# Patient Record
Sex: FEMALE | Race: WHITE | NOT HISPANIC OR LATINO | Employment: FULL TIME | URBAN - METROPOLITAN AREA
[De-identification: names, ages, dates, MRNs, and addresses within clinical notes are randomized per-mention and may not be internally consistent; named-entity substitution may affect disease eponyms.]

---

## 2017-08-23 ENCOUNTER — TRANSFERRED RECORDS (OUTPATIENT)
Dept: MULTI SPECIALTY CLINIC | Facility: CLINIC | Age: 57
End: 2017-08-23

## 2017-08-23 LAB
HPV ABSTRACT: NORMAL
PAP-ABSTRACT: NORMAL

## 2019-01-01 ENCOUNTER — TRANSFERRED RECORDS (OUTPATIENT)
Dept: MULTI SPECIALTY CLINIC | Facility: CLINIC | Age: 59
End: 2019-01-01

## 2020-08-18 ENCOUNTER — OFFICE VISIT (OUTPATIENT)
Dept: URGENT CARE | Facility: URGENT CARE | Age: 60
End: 2020-08-18
Payer: COMMERCIAL

## 2020-08-18 VITALS
BODY MASS INDEX: 28.17 KG/M2 | HEIGHT: 64 IN | HEART RATE: 63 BPM | TEMPERATURE: 97.5 F | WEIGHT: 165 LBS | SYSTOLIC BLOOD PRESSURE: 131 MMHG | OXYGEN SATURATION: 97 % | DIASTOLIC BLOOD PRESSURE: 51 MMHG

## 2020-08-18 DIAGNOSIS — R07.0 THROAT PAIN: Primary | ICD-10-CM

## 2020-08-18 LAB
DEPRECATED S PYO AG THROAT QL EIA: NEGATIVE
SPECIMEN SOURCE: NORMAL
SPECIMEN SOURCE: NORMAL
STREP GROUP A PCR: NOT DETECTED

## 2020-08-18 PROCEDURE — 87651 STREP A DNA AMP PROBE: CPT | Performed by: PHYSICIAN ASSISTANT

## 2020-08-18 PROCEDURE — U0003 INFECTIOUS AGENT DETECTION BY NUCLEIC ACID (DNA OR RNA); SEVERE ACUTE RESPIRATORY SYNDROME CORONAVIRUS 2 (SARS-COV-2) (CORONAVIRUS DISEASE [COVID-19]), AMPLIFIED PROBE TECHNIQUE, MAKING USE OF HIGH THROUGHPUT TECHNOLOGIES AS DESCRIBED BY CMS-2020-01-R: HCPCS | Performed by: PHYSICIAN ASSISTANT

## 2020-08-18 PROCEDURE — 99203 OFFICE O/P NEW LOW 30 MIN: CPT

## 2020-08-18 ASSESSMENT — MIFFLIN-ST. JEOR: SCORE: 1285.5

## 2020-08-18 ASSESSMENT — ENCOUNTER SYMPTOMS
SORE THROAT: 1
FATIGUE: 1

## 2020-08-18 NOTE — PATIENT INSTRUCTIONS
Patient Education     Self-Care for Sore Throats    Sore throats happen for many reasons, such as colds, allergies, and infections caused by viruses or bacteria. In any case, your throat becomes red and sore. Your goal for self-care is to reduce your discomfort while giving your throat a chance to heal.  Moisten and soothe your throat  Tips include the following:    Try a sip of water first thing after waking up.    Keep your throat moist by drinking 6 or more glasses of clear liquids every day.    Run a cool-air humidifier in your room overnight.    Avoid cigarette smoke.     Suck on throat lozenges, cough drops, hard candy, ice chips, or frozen fruit-juice bars. Use the sugar-free versions if your diet or medical condition requires them.  Gargle to ease irritation  Gargling every hour or 2 can ease irritation. Try gargling with 1 of these solutions:    1/4 teaspoon of salt in 1/2 cup of warm water    An over-the-counter anesthetic gargle  Use medicine for more relief  Over-the-counter medicine can reduce sore throat symptoms. Ask your pharmacist if you have questions about which medicine to use:    Ease pain with anesthetic sprays. Aspirin or an aspirin substitute also helps. Remember, never give aspirin to anyone 18 or younger, or if you are already taking blood thinners.     For sore throats caused by allergies, try antihistamines to block the allergic reaction.    Remember: unless a sore throat is caused by a bacterial infection, antibiotics won t help you.  Prevent future sore throats  Prevention tips include the following:    Stop smoking or reduce contact with secondhand smoke. Smoke irritates the tender throat lining.    Limit contact with pets and with allergy-causing substances, such as pollen and mold.    When you re around someone with a sore throat or cold, wash your hands often to keep viruses or bacteria from spreading.    Don t strain your vocal cords.  Contact your healthcare provider if you  have:    A temperature over 101 F (38.3 C)    White spots on the throat    Great difficulty swallowing    Trouble breathing    A skin rash    Recent exposure to someone else with strep bacteria    Severe hoarseness and swollen glands in the neck or jaw  Date Last Reviewed: 8/1/2016 2000-2019 [employer]. [address] [place] [address]. All rights reserved. This information is not intended as a substitute for professional medical care. Always follow your healthcare professional's instructions.           Patient Education     Understanding COVID-19 (Coronavirus Disease 2019)  COVID-19 is an illness of the lungs. It causes fever, coughing and trouble breathing. The illness is caused by a new virus in the coronavirus family called SARS-CoV-2.  First seen in late 2019, this virus has spread to many cities and countries around the world. Scientists are working to understand it better.   For the latest information, visit the CDC website at www.cdc.gov/coronavirus/2019-ncov. Or call 544-LBN-SVSZ ([phone]).  How does COVID-19 spread?   The virus seems to spread and infect people fairly easily. It may spread by:    Breathing in droplets of fluid that someone coughs or sneezes into the air.    Touching your eyes, nose or mouth after touching an infected surface. (The virus can live on handles, objects, and other surfaces.)  What are the symptoms of COVID-19?  Some people have no symptoms or only mild symptoms. Symptoms can vary from person to person. As experts learn more about COVID-19, new symptoms are being reported.  Symptoms may appear 2 to 14 days after contact with the virus. They include:    Fever    Coughing    Sore throat    Runny nose    Trouble breathing or feeling short of breath    Headache and body aches    Chills or repeated shaking with chills    Fatigue (feeling very tired)    Loss of appetite    Nausea or vomiting (feeling sick to your stomach or throwing up)    Diarrhea  (loose, watery poops)    Abdominal (belly) pain    Loss of smell and taste  You can check your symptoms with the [facility] s Coronavirus Self-.  What are possible problems from COVID-19?  In many cases, this virus can cause infection (pneumonia) in both lungs. This can make a person very ill, and it can even cause death.  Your chances of severe illness are higher if:    You re an older adult    You have a serious health issue, such as heart or lung disease, diabetes or kidney disease    You have a health problem (or take a medicine) that suppresses the immune system  Rarely, some children develop a severe problem called MIS-C. This is similar to Kawaski disease, which causes blood vessels and body organs to be inflamed. We don t yet know if MIS-C happens only in children, or if adults are also at risk. We also don t know if it's related to COVID-19--many children with MIS-C test positive for the virus, but not all.  Experts continue to study MIS-C. The [facility] has asked hospitals to report any person under 21 who has all of the following:    A fever over 100.4 F (38.0 C) for more than 24 hours and either a positive COVID-19 test or exposure to the virus in the last 4 weeks    Inflammation in at least 2 organs such as the heart, lungs or kidneys, with lab tests that show inflammation    No other diagnoses besides COVID-19 explain the child s symptoms  How is COVID-19 diagnosed?  Your care team will ask about your symptoms, recent travel and contact with sick people.  Not everyone will be tested for the virus. If you need to be tested, we will use a cotton swab to take a sample of cells from your nose and throat.    Antibody test (blood test). This test may show if you ve had the virus in the past--it won t tell us if you have it right now. (It takes a few weeks for the antibodies to show up in your blood). If you ve had the virus, you may have immunity (protection from the virus) in the future. We don t know yet how  long you would be immune. Antibody tests aren t always accurate.    Sputum test (we may collect mucus that you have coughed up from your lungs)    Chest X-ray or CT scan  How is COVID-19 treated?  At this time, there is no medicine to prevent or treat COVID-19. Experts are testing different medicines, trying to find one that works.  So far, the most proven treatment is to support your body while it fights the virus.    Get extra rest to help your body fight the illness.    Drink extra fluids (6 to 8 glasses of liquids each day), unless a doctor has told you not to. Ask your care team which fluids are best for you. Avoid fluids that contain caffeine or alcohol.    Take over-the-counter (OTC) medicine to reduce pain and fever. Your care team will tell you which medicine to use.  If you are very sick, you may need to stay in the hospital.    We may give you fluids through a vein to keep you hydrated (IV fluids).    To make sure your body gets enough oxygen, we may give you an oxygen mask or a breathing machine (ventilator).    We may turn you onto your stomach (called  prone positioning ). This will increase the oxygen in your lungs.  Those who have recovered from COVID-19 may be asked to donate plasma. (This is called COVID-19 convalescent plasma donation.) The plasma may contain antibodies to help fight the virus in others. Scientists are testing whether this might be a treatment in the future. For more information, talk to your care team.  Are you at risk for COVID-19?  Some studies suggest that people without symptoms may spread COVID-19.  You re at risk for getting COVID-19 if:    You live in (or recently traveled to) an area with a COVID-19 outbreak.    You had close contact with someone who has or may have COVID-19. Close contact means being within 6 feet, living in the same house, or visiting.  Last modified date: 5/20/2020  This information has been modified by your health care provider with permission from  the publisher.    [id number] [facility], [address] [place] [address], [place] [address]. All rights reserved. This information is not intended as a substitute for professional medical care. Always follow your healthcare professional's instructions. This information has been modified by your health care provider with permission from the publisher.

## 2020-08-18 NOTE — PROGRESS NOTES
"SUBJECTIVE:   Korin Islas is a 60 year old female presenting with a chief complaint of   Chief Complaint   Patient presents with     Urgent Care     Pt in clinic to have eval for throat irritation.     Throat Problem       She is a new patient of [facility].  ST and fatigue x 7 days.  No treatment.      URI Adult    Onset of symptoms was 7 day(s) ago.  Course of illness is same.    Severity moderate  Current and Associated symptoms: fatigue  Treatment measures tried include None tried.  Predisposing factors include None.        Review of Systems   Constitutional: Positive for fatigue.   HENT: Positive for sore throat.    All other systems reviewed and are negative.      No past medical history on file.  No family history on file.  Current Outpatient Medications   Medication Sig Dispense Refill     UNKNOWN TO PATIENT        Social History     Tobacco Use     Smoking status: Never Smoker     Smokeless tobacco: Never Used   Substance Use Topics     Alcohol use: Not on file       OBJECTIVE  /51   Pulse 63   Temp 97.5  F (36.4  C) (Oral)   Ht 1.613 m (5' 3.5\")   Wt 74.8 kg (165 lb)   SpO2 97%   BMI 28.77 kg/m      Physical Exam  Vitals signs and nursing note reviewed.   Constitutional:       Appearance: Normal appearance. She is normal weight.   HENT:      Head: Normocephalic and atraumatic.      Right Ear: Tympanic membrane, ear canal and external ear normal.      Left Ear: Tympanic membrane, ear canal and external ear normal.      Mouth/Throat:      Mouth: Mucous membranes are moist.      Pharynx: Oropharynx is clear. Posterior oropharyngeal erythema present.   Eyes:      Extraocular Movements: Extraocular movements intact.      Conjunctiva/sclera: Conjunctivae normal.   Neck:      Musculoskeletal: Normal range of motion and neck supple.   Cardiovascular:      Rate and Rhythm: Normal rate and regular rhythm.      Pulses: Normal pulses.      Heart sounds: Normal heart sounds.   Pulmonary:      Effort: " Pulmonary effort is normal.      Breath sounds: Normal breath sounds.   Skin:     General: Skin is warm and dry.      Capillary Refill: Capillary refill takes less than 2 seconds.   Neurological:      General: No focal deficit present.      Mental Status: She is alert.   Psychiatric:         Mood and Affect: Mood normal.         Behavior: Behavior normal.         Labs:  No results found for this or any previous visit (from the past 24 hour(s)).    X-Ray was not done.    ASSESSMENT:      ICD-10-CM    1. Throat pain  R07.0 Streptococcus A Rapid Scr w Reflx to PCR     Group A Streptococcus PCR Throat Swab     Symptomatic COVID-19 Virus (Coronavirus) by PCR        Medical Decision Making:    Differential Diagnosis:  URI Adult/Peds:  Strep pharyngitis and Viral pharyngitis    Serious Comorbid Conditions:  Adult:  None    PLAN:    URI Adult:  Tylenol, Ibuprofen, Fluids and Rest.  COVID test pending.      Followup:    If not improving or if condition worsens, follow up with your Primary Care Provider, If not improving or if conditions worsens over the next 12-24 hours, go to the Emergency Department    Patient Instructions     Patient Education     Self-Care for Sore Throats    Sore throats happen for many reasons, such as colds, allergies, and infections caused by viruses or bacteria. In any case, your throat becomes red and sore. Your goal for self-care is to reduce your discomfort while giving your throat a chance to heal.  Moisten and soothe your throat  Tips include the following:    Try a sip of water first thing after waking up.    Keep your throat moist by drinking 6 or more glasses of clear liquids every day.    Run a cool-air humidifier in your room overnight.    Avoid cigarette smoke.     Suck on throat lozenges, cough drops, hard candy, ice chips, or frozen fruit-juice bars. Use the sugar-free versions if your diet or medical condition requires them.  Gargle to ease irritation  Gargling every hour or 2 can ease  irritation. Try gargling with 1 of these solutions:    1/4 teaspoon of salt in 1/2 cup of warm water    An over-the-counter anesthetic gargle  Use medicine for more relief  Over-the-counter medicine can reduce sore throat symptoms. Ask your pharmacist if you have questions about which medicine to use:    Ease pain with anesthetic sprays. Aspirin or an aspirin substitute also helps. Remember, never give aspirin to anyone 18 or younger, or if you are already taking blood thinners.     For sore throats caused by allergies, try antihistamines to block the allergic reaction.    Remember: unless a sore throat is caused by a bacterial infection, antibiotics won t help you.  Prevent future sore throats  Prevention tips include the following:    Stop smoking or reduce contact with secondhand smoke. Smoke irritates the tender throat lining.    Limit contact with pets and with allergy-causing substances, such as pollen and mold.    When you re around someone with a sore throat or cold, wash your hands often to keep viruses or bacteria from spreading.    Don t strain your vocal cords.  Contact your healthcare provider if you have:    A temperature over 101 F (38.3 C)    White spots on the throat    Great difficulty swallowing    Trouble breathing    A skin rash    Recent exposure to someone else with strep bacteria    Severe hoarseness and swollen glands in the neck or jaw  Date Last Reviewed: 8/1/2016 2000-2019 [employer]. [address] [place], [place] [id number]. All rights reserved. This information is not intended as a substitute for professional medical care. Always follow your healthcare professional's instructions.           Patient Education     Understanding COVID-19 (Coronavirus Disease 2019)  COVID-19 is an illness of the lungs. It causes fever, coughing and trouble breathing. The illness is caused by a new virus in the coronavirus family called SARS-CoV-2.  First seen in late 2019, this virus has  spread to many cities and countries around the world. Scientists are working to understand it better.   For the latest information, visit the CDC website at www.cdc.gov/coronavirus/2019-ncov. Or call 565-QKX-FOGW ([phone]).  How does COVID-19 spread?   The virus seems to spread and infect people fairly easily. It may spread by:    Breathing in droplets of fluid that someone coughs or sneezes into the air.    Touching your eyes, nose or mouth after touching an infected surface. (The virus can live on handles, objects, and other surfaces.)  What are the symptoms of COVID-19?  Some people have no symptoms or only mild symptoms. Symptoms can vary from person to person. As experts learn more about COVID-19, new symptoms are being reported.  Symptoms may appear 2 to 14 days after contact with the virus. They include:    Fever    Coughing    Sore throat    Runny nose    Trouble breathing or feeling short of breath    Headache and body aches    Chills or repeated shaking with chills    Fatigue (feeling very tired)    Loss of appetite    Nausea or vomiting (feeling sick to your stomach or throwing up)    Diarrhea (loose, watery poops)    Abdominal (belly) pain    Loss of smell and taste  You can check your symptoms with the CDC s Coronavirus Self-.  What are possible problems from COVID-19?  In many cases, this virus can cause infection (pneumonia) in both lungs. This can make a person very ill, and it can even cause death.  Your chances of severe illness are higher if:    You re an older adult    You have a serious health issue, such as heart or lung disease, diabetes or kidney disease    You have a health problem (or take a medicine) that suppresses the immune system  Rarely, some children develop a severe problem called MIS-C. This is similar to Kawaski disease, which causes blood vessels and body organs to be inflamed. We don t yet know if MIS-C happens only in children, or if adults are also at risk. We also  don t know if it's related to COVID-19--many children with MIS-C test positive for the virus, but not all.  Experts continue to study MIS-C. The [facility] has asked hospitals to report any person under 21 who has all of the following:    A fever over 100.4 F (38.0 C) for more than 24 hours and either a positive COVID-19 test or exposure to the virus in the last 4 weeks    Inflammation in at least 2 organs such as the heart, lungs or kidneys, with lab tests that show inflammation    No other diagnoses besides COVID-19 explain the child s symptoms  How is COVID-19 diagnosed?  Your care team will ask about your symptoms, recent travel and contact with sick people.  Not everyone will be tested for the virus. If you need to be tested, we will use a cotton swab to take a sample of cells from your nose and throat.    Antibody test (blood test). This test may show if you ve had the virus in the past--it won t tell us if you have it right now. (It takes a few weeks for the antibodies to show up in your blood). If you ve had the virus, you may have immunity (protection from the virus) in the future. We don t know yet how long you would be immune. Antibody tests aren t always accurate.    Sputum test (we may collect mucus that you have coughed up from your lungs)    Chest X-ray or CT scan  How is COVID-19 treated?  At this time, there is no medicine to prevent or treat COVID-19. Experts are testing different medicines, trying to find one that works.  So far, the most proven treatment is to support your body while it fights the virus.    Get extra rest to help your body fight the illness.    Drink extra fluids (6 to 8 glasses of liquids each day), unless a doctor has told you not to. Ask your care team which fluids are best for you. Avoid fluids that contain caffeine or alcohol.    Take over-the-counter (OTC) medicine to reduce pain and fever. Your care team will tell you which medicine to use.  If you are very sick, you may need to  stay in the hospital.    We may give you fluids through a vein to keep you hydrated (IV fluids).    To make sure your body gets enough oxygen, we may give you an oxygen mask or a breathing machine (ventilator).    We may turn you onto your stomach (called  prone positioning ). This will increase the oxygen in your lungs.  Those who have recovered from COVID-19 may be asked to donate plasma. (This is called COVID-19 convalescent plasma donation.) The plasma may contain antibodies to help fight the virus in others. Scientists are testing whether this might be a treatment in the future. For more information, talk to your care team.  Are you at risk for COVID-19?  Some studies suggest that people without symptoms may spread COVID-19.  You re at risk for getting COVID-19 if:    You live in (or recently traveled to) an area with a COVID-19 outbreak.    You had close contact with someone who has or may have COVID-19. Close contact means being within 6 feet, living in the same house, or visiting.  Last modified date: 5/20/2020  This information has been modified by your health care provider with permission from the publisher.    [id number] [facility], [address] [place] [address], [place] [address]. All rights reserved. This information is not intended as a substitute for professional medical care. Always follow your healthcare professional's instructions. This information has been modified by your health care provider with permission from the publisher.

## 2020-08-19 LAB
SARS-COV-2 RNA SPEC QL NAA+PROBE: NOT DETECTED
SPECIMEN SOURCE: NORMAL

## 2021-11-02 ENCOUNTER — LAB (OUTPATIENT)
Dept: LAB | Facility: CLINIC | Age: 61
End: 2021-11-02

## 2021-11-02 ENCOUNTER — OFFICE VISIT (OUTPATIENT)
Dept: FAMILY MEDICINE | Facility: CLINIC | Age: 61
End: 2021-11-02
Payer: COMMERCIAL

## 2021-11-02 VITALS
SYSTOLIC BLOOD PRESSURE: 122 MMHG | DIASTOLIC BLOOD PRESSURE: 82 MMHG | WEIGHT: 173 LBS | OXYGEN SATURATION: 96 % | BODY MASS INDEX: 30.16 KG/M2 | HEART RATE: 68 BPM | TEMPERATURE: 97.1 F

## 2021-11-02 DIAGNOSIS — F33.9 RECURRENT MAJOR DEPRESSIVE DISORDER, REMISSION STATUS UNSPECIFIED (H): ICD-10-CM

## 2021-11-02 DIAGNOSIS — Z12.31 ENCOUNTER FOR SCREENING MAMMOGRAM FOR BREAST CANCER: ICD-10-CM

## 2021-11-02 DIAGNOSIS — M54.2 CERVICALGIA: Primary | ICD-10-CM

## 2021-11-02 DIAGNOSIS — M05.79 RHEUMATOID ARTHRITIS INVOLVING MULTIPLE SITES WITH POSITIVE RHEUMATOID FACTOR (H): ICD-10-CM

## 2021-11-02 DIAGNOSIS — Z13.220 SCREENING CHOLESTEROL LEVEL: ICD-10-CM

## 2021-11-02 DIAGNOSIS — M50.30 DDD (DEGENERATIVE DISC DISEASE), CERVICAL: ICD-10-CM

## 2021-11-02 DIAGNOSIS — M54.2 CERVICALGIA: ICD-10-CM

## 2021-11-02 LAB
AMPHETAMINES UR QL: NOT DETECTED
BARBITURATES UR QL SCN: NOT DETECTED
BENZODIAZ UR QL SCN: NOT DETECTED
BUPRENORPHINE UR QL: NOT DETECTED
CANNABINOIDS UR QL: NOT DETECTED
COCAINE UR QL SCN: NOT DETECTED
D-METHAMPHET UR QL: NOT DETECTED
METHADONE UR QL SCN: NOT DETECTED
OPIATES UR QL SCN: NOT DETECTED
OXYCODONE UR QL SCN: NOT DETECTED
PCP UR QL SCN: NOT DETECTED
PROPOXYPH UR QL: NOT DETECTED
TRICYCLICS UR QL SCN: NOT DETECTED

## 2021-11-02 PROCEDURE — 36415 COLL VENOUS BLD VENIPUNCTURE: CPT

## 2021-11-02 PROCEDURE — 80306 DRUG TEST PRSMV INSTRMNT: CPT

## 2021-11-02 PROCEDURE — 99214 OFFICE O/P EST MOD 30 MIN: CPT | Performed by: NURSE PRACTITIONER

## 2021-11-02 PROCEDURE — 80061 LIPID PANEL: CPT

## 2021-11-02 RX ORDER — CITALOPRAM HYDROBROMIDE 10 MG/1
10 TABLET ORAL DAILY
Qty: 90 TABLET | Refills: 3 | Status: SHIPPED | OUTPATIENT
Start: 2021-11-02 | End: 2022-09-29

## 2021-11-02 RX ORDER — HYDROCODONE BITARTRATE AND ACETAMINOPHEN 5; 325 MG/1; MG/1
1 TABLET ORAL EVERY 6 HOURS PRN
Qty: 18 TABLET | Refills: 0 | Status: SHIPPED | OUTPATIENT
Start: 2021-11-02 | End: 2021-11-05

## 2021-11-02 NOTE — LETTER
Opioid / Opioid Plus Controlled Substance Agreement    This is an agreement between you and your provider about the safe and appropriate use of controlled substance/opioids prescribed by your care team. Controlled substances are medicines that can cause physical and mental dependence (abuse).    There are strict laws about having and using these medicines. We here at [facility] are committing to working with you in your efforts to get better. To support you in this work, we ll help you schedule regular office appointments for medicine refills. If we must cancel or change your appointment for any reason, we ll make sure you have enough medicine to last until your next appointment.     As a Provider, I will:    Listen carefully to your concerns and treat you with respect.     Recommend a treatment plan that I believe is in your best interest. This plan may involve therapies other than opioid pain medication.     Talk with you often about the possible benefits, and the risk of harm of any medicine that we prescribe for you.     Provide a plan on how to taper (discontinue or go off) using this medicine if the decision is made to stop its use.    As a Patient, I understand that opioid(s):     Are a controlled substance prescribed by my care team to help me function or work and manage my condition(s).     Are strong medicines and can cause serious side effects such as:    Drowsiness, which can seriously affect my driving ability    A lower breathing rate, enough to cause death    Harm to my thinking ability     Depression     Abuse of and addiction to this medicine    Need to be taken exactly as prescribed. Combining opioids with certain medicines or chemicals (such as illegal drugs, sedatives, sleeping pills, and benzodiazepines) can be dangerous or even fatal. If I stop opioids suddenly, I may have severe withdrawal symptoms.    Do not work for all types of pain nor for all patients. If they re not helpful, I may  be asked to stop them.        The risks, benefits and side effects of these medicine(s) were explained to me. I agree that:  1. I will take part in other treatments as advised by my care team. This may be psychiatry or counseling, physical therapy, behavioral therapy, group treatment or a referral to a specialist.     2. I will keep all my appointments. I understand that this is part of the monitoring of opioids. My care team may require an office visit for EVERY opioid/controlled substance refill. If I miss appointments or don t follow instructions, my care team may stop my medicine.    3. I will take my medicines as prescribed. I will not change the dose or schedule unless my care team tells me to. There will be no refills if I run out early.     4. I may be asked to come to the clinic and complete a urine drug test or complete a pill count at any time. If I don t give a urine sample or participate in a pill count, the care team may stop my medicine.    5. I will only receive prescriptions from this clinic for chronic pain. If I am treated by another provider for acute pain issues, I will tell them that I am taking opioid pain medication for chronic pain and that I have a treatment agreement with this provider. I will inform my [facility] care team within one business day if I am given a prescription for any pain medication by another healthcare provider. My [facility] care team can contact other providers and pharmacists about my use of any medicines.    6. It is up to me to make sure that I don t run out of my medicines on weekends or holidays. If my care team is willing to refill my opioid prescription without a visit, I must request refills only during office hours. Refills may take up to 3 business days to process. I will use one pharmacy to fill all my opioid and other controlled substance prescriptions. I will notify the clinic about any changes to my insurance or medication  availability.    7. I am responsible for my prescriptions. If the medicine/prescription is lost, stolen or destroyed, it will not be replaced. I also agree not to share controlled substance medicines with anyone.    8. I am aware I should not use any illegal or recreational drugs. I agree not to drink alcohol unless my care team says I can.       9. If I enroll in the [place] [facility] Cannabis program, I will tell my care team prior to my next refill.     10. I will tell my care team right away if I become pregnant, have a new medical problem treated outside of my regular clinic, or have a change in my medications.    11. I understand that this medicine can affect my thinking, judgment and reaction time. Alcohol and drugs affect the brain and body, which can affect the safety of my driving. Being under the influence of alcohol or drugs can affect my decision-making, behaviors, personal safety, and the safety of others. Driving while impaired (DWI) can occur if a person is driving, operating, or in physical control of a car, motorcycle, boat, snowmobile, ATV, motorbike, off-road vehicle, or any other motor vehicle ([place] Statute 169A.20). I understand the risk if I choose to drive or operate any vehicle or machinery.    I understand that if I do not follow any of the conditions above, my prescriptions or treatment may be stopped or changed.          Opioids  What You Need to Know    What are opioids?   Opioids are pain medicines that must be prescribed by a doctor. They are also known as narcotics.     Examples are:   1. morphine (MS Contin, Claire)  2. oxycodone (Oxycontin)  3. oxycodone and acetaminophen (Percocet)  4. hydrocodone and acetaminophen (Vicodin, Norco)   5. fentanyl patch (Duragesic)   6. hydromorphone (Dilaudid)   7. methadone  8. codeine (Tylenol #3)     What do opioids do well?   Opioids are best for severe short-term pain such as after a surgery or injury. They may work well for cancer pain. They may  help some people with long-lasting (chronic) pain.     What do opioids NOT do well?   Opioids never get rid of pain entirely, and they don t work well for most patients with chronic pain. Opioids don t reduce swelling, one of the causes of pain.                                    Other ways to manage chronic pain and improve function include:       Treat the health problem that may be causing pain    Anti-inflammation medicines, which reduce swelling and tenderness, such as ibuprofen (Advil, Motrin) or naproxen (Aleve)    Acetaminophen (Tylenol)    Antidepressants and anti-seizure medicines, especially for nerve pain    Topical treatments such as patches or creams    Injections or nerve blocks    Chiropractic or osteopathic treatment    Acupuncture, massage, deep breathing, meditation, visual imagery, aromatherapy    Use heat or ice at the pain site    Physical therapy     Exercise    Stop smoking    Take part in therapy       Risks and side effects     Talk to your doctor before you start or decide to keep taking opioids. Possible side effects include:      Lowering your breathing rate enough to cause death    Overdose, including death, especially if taking higher than prescribed doses    Worse depression symptoms; less pleasure in things you usually enjoy    Feeling tired or sluggish    Slower thoughts or cloudy thinking    Being more sensitive to pain over time; pain is harder to control    Trouble sleeping or restless sleep    Changes in hormone levels (for example, less testosterone)    Changes in sex drive or ability to have sex    Constipation    Unsafe driving    Itching and sweating    Dizziness    Nausea, throwing up and dry mouth    What else should I know about opioids?    Opioids may lead to dependence, tolerance, or addiction.      Dependence means that if you stop or reduce the medicine too quickly, you will have withdrawal symptoms. These include loose poop (diarrhea), jitters, flu-like symptoms,  nervousness and tremors. Dependence is not the same as addiction.                       Tolerance means needing higher doses over time to get the same effect. This may increase the chance of serious side effects.      Addiction is when people improperly use a substance that harms their body, their mind or their relations with others. Use of opiates can cause a relapse of addiction if you have a history of drug or alcohol abuse.      People who have used opioids for a long time may have a lower quality of life, worse depression, higher levels of pain and more visits to doctors.    You can overdose on opioids. Take these steps to lower your risk of overdose:    1. Recognize the signs:  Signs of overdose include decrease or loss of consciousness (blackout), slowed breathing, trouble waking up and blue lips. If someone is worried about overdose, they should call 911.    2. Talk to your doctor about Narcan (naloxone).   If you are at risk for overdose, you may be given a prescription for Narcan. This medicine very quickly reverses the effects of opioids.   If you overdose, a friend or family member can give you Narcan while waiting for the ambulance. They need to know the signs of overdose and how to give Narcan.     3. Don't use alcohol or street drugs.   Taking them with opioids can cause death.    4. Do not take any of these medicines unless your doctor says it s OK. Taking these with opioids can cause death:    Benzodiazepines, such as lorazepam (Ativan), alprazolam (Xanax) or diazepam (Valium)    Muscle relaxers, such as cyclobenzaprine (Flexeril)    Sleeping pills like zolpidem (Ambien)     Other opioids      How to keep you and other people safe while taking opioids:    1. Never share your opioids with others.  Opioid medicines are regulated by the Drug Enforcement Agency (MATILDE). Selling or sharing medications is a criminal act.    2. Be sure to store opioids in a secure place, locked up if possible. Young children  can easily swallow them and overdose.    3. When you are traveling with your medicines, keep them in the original bottles. If you use a pill box, be sure you also carry a copy of your medicine list from your clinic or pharmacy.    4. Safe disposal of opioids    Most pharmacies have places to get rid of medicine, called disposal kiosks. Medicine disposal options are also available in every [facility]. Search your county and  medication disposal  to find more options. You can find more details at:  [email or web address]     I agree that my provider, clinic care team, and pharmacy may work with any city, state or federal law enforcement agency that investigates the misuse, sale, or other diversion of my controlled medicine. I will allow my provider to discuss my care with, or share a copy of, this agreement with any other treating provider, pharmacy or emergency room where I receive care.    I have read this agreement and have asked questions about anything I did not understand.    _______________________________________________________  Patient Signature - Korin Islas _____________________                   Date     _______________________________________________________  Provider Signature - KAHLIL Tarcy CNP   _____________________                   Date     _______________________________________________________  Witness Signature (required if provider not present while patient signing)   _____________________                   Date

## 2021-11-02 NOTE — PROGRESS NOTES
SUBJECTIVE:  Korin Islas, a 61 year old female, here today for an appointment to establish care and to discuss the following issues:    Chronic Neck Pain: has had two prior procedures on neck and spine, more than 15 years ago. Neck pain has been manageable with infrequent use of Norco; maybe once per week if pain is bad. Has had an MRI several years ago, none recently. No major changes, She notices daily stiffness and pain Has done physical therapy in the past and she keeps up with daily exercises. Has occasional numbness and tingling in hands. Has also previously been diagnosed with RA; has pain in hands, wrists and ankles as well; has not previously seen rheumatology.   s/p injection 9/2006 for ongoing pain- medications tried: tramadol and gabapentin  MRI- 2/7/2012 Last injection- 3/2008-2/7/2012         Takes Celexa for depression; started when her mom passed away and son was diagnosed with rectal cancer; she has kept taking it the past few years because she gets irritable when she stops it, but overall feels that mood is good.Notices some sweating at night; started menopause around age 38.    Pap Smear: ASCUS of cervix with negative high risk HPV 2013 2013 ASCUS/HPV negative. Plan: Routine screening. Pap/HPV due 8/2022         HPI:    Where was your previous physician's [facility]?: [facility]   Physician's Name: Genesis Brown   Last physical: not sure but about 1 year   Pt permission to access these records? (Care Everywhere or NACNIE): Yes       Social History     Tobacco Use     Smoking status: Never Smoker     Smokeless tobacco: Never Used   Substance Use Topics     Alcohol use: Not Currently     Comment: 0-1 per week       ROS:  CV: NEGATIVE for chest pain, palpitations   GI: NEGATIVE for nausea, abdominal pain, heartburn, or change in bowel habits  : NEGATIVE for frequency, dysuria, or hematuria  C: NEGATIVE for fever, chills  E: NEGATIVE for vision changes   R: NEGATIVE for significant cough or  SOB  N: NEGATIVE for weakness, dizziness or paresthesias or headache    OBJECTIVE:    /82   Pulse 68   Temp 97.1  F (36.2  C) (Temporal)   Wt 78.5 kg (173 lb)   SpO2 96%   BMI 30.16 kg/m      EXAM:  GENERAL APPEARANCE: healthy, alert and no distress  EYES: EOMI,  PERRL  RESP: lungs clear to auscultation - no rales, rhonchi or wheezes  CV: regular rates and rhythm, normal S1 S2, no S3 or S4 and no murmur, click or rub -  ABDOMEN:  soft, nontender, no HSM or masses and bowel sounds normal  MS: extremities normal- no gross deformities noted, no evidence of inflammation in joints, FROM in all extremities.  SKIN: no suspicious lesions or rashes  NEURO: Normal strength and tone, sensory exam grossly normal, mentation intact and speech normal  PSYCH: mentation appears normal and affect normal/bright    ASSESSMENT/PLAN:      ICD-10-CM    1. Cervicalgia  M54.2 HYDROcodone-acetaminophen (NORCO) 5-325 MG tablet     Drug Abuse Screen Panel 13, Urine (Pain Care Package) - lab collect   2. Recurrent major depressive disorder, remission status unspecified (H)  F33.9 citalopram (CELEXA) 10 MG tablet   3. Rheumatoid arthritis involving multiple sites with positive rheumatoid factor (H)  M05.79 Drug Abuse Screen Panel 13, Urine (Pain Care Package) - lab collect     Rheumatology Referral   4. Encounter for screening mammogram for breast cancer  Z12.31 *MA Screening Digital Bilateral   5. Screening cholesterol level  Z13.220 Lipid panel reflex to direct LDL Fasting

## 2021-11-03 LAB
CHOLEST SERPL-MCNC: 280 MG/DL
FASTING STATUS PATIENT QL REPORTED: YES
HDLC SERPL-MCNC: 58 MG/DL
LDLC SERPL CALC-MCNC: 180 MG/DL
NONHDLC SERPL-MCNC: 222 MG/DL
TRIGL SERPL-MCNC: 211 MG/DL

## 2022-09-29 ENCOUNTER — TELEPHONE (OUTPATIENT)
Dept: NURSING | Facility: CLINIC | Age: 62
End: 2022-09-29

## 2022-09-29 DIAGNOSIS — F33.9 RECURRENT MAJOR DEPRESSIVE DISORDER, REMISSION STATUS UNSPECIFIED (H): ICD-10-CM

## 2022-09-29 RX ORDER — CITALOPRAM HYDROBROMIDE 10 MG/1
10 TABLET ORAL DAILY
Qty: 90 TABLET | Refills: 1 | Status: SHIPPED | OUTPATIENT
Start: 2022-09-29 | End: 2023-03-07

## 2022-09-29 NOTE — TELEPHONE ENCOUNTER
"Refill/Order request:     Pt reports that she canceled her order with [facility] for her Citalopram because patient was trying to \"go off of it\", but when she tried to do so she had worsening anxiety.     Pt is calling today to report that [facility] is asking for a new prescription for this medication because they have discontinued it in their system.     Pt also reports that she has an annual appt with her PCP in December, 2022 as that was the soonest they could get her in.     Writer is routing this message to patient's care team pool to process this request.     Sandee Jacques RN  [facility] Nurse Advisor 11:40 AM 9/29/2022        "

## 2022-12-29 ENCOUNTER — LAB (OUTPATIENT)
Dept: LAB | Facility: CLINIC | Age: 62
End: 2022-12-29
Payer: COMMERCIAL

## 2022-12-29 ENCOUNTER — OFFICE VISIT (OUTPATIENT)
Dept: FAMILY MEDICINE | Facility: CLINIC | Age: 62
End: 2022-12-29
Payer: COMMERCIAL

## 2022-12-29 VITALS
OXYGEN SATURATION: 97 % | TEMPERATURE: 98.9 F | HEART RATE: 64 BPM | DIASTOLIC BLOOD PRESSURE: 75 MMHG | SYSTOLIC BLOOD PRESSURE: 124 MMHG | BODY MASS INDEX: 30.48 KG/M2 | HEIGHT: 63 IN | WEIGHT: 172 LBS | RESPIRATION RATE: 19 BRPM

## 2022-12-29 DIAGNOSIS — E55.9 VITAMIN D DEFICIENCY: ICD-10-CM

## 2022-12-29 DIAGNOSIS — Z12.4 SCREENING FOR CERVICAL CANCER: ICD-10-CM

## 2022-12-29 DIAGNOSIS — Z12.11 SCREENING FOR COLON CANCER: ICD-10-CM

## 2022-12-29 DIAGNOSIS — Z00.00 ROUTINE HISTORY AND PHYSICAL EXAMINATION OF ADULT: Primary | ICD-10-CM

## 2022-12-29 DIAGNOSIS — R06.83 SNORING: ICD-10-CM

## 2022-12-29 DIAGNOSIS — M05.79 RHEUMATOID ARTHRITIS INVOLVING MULTIPLE SITES WITH POSITIVE RHEUMATOID FACTOR (H): ICD-10-CM

## 2022-12-29 DIAGNOSIS — Z13.220 SCREENING CHOLESTEROL LEVEL: ICD-10-CM

## 2022-12-29 DIAGNOSIS — Z12.31 ENCOUNTER FOR SCREENING MAMMOGRAM FOR BREAST CANCER: ICD-10-CM

## 2022-12-29 PROBLEM — F41.8 DEPRESSION WITH ANXIETY: Status: ACTIVE | Noted: 2020-01-29

## 2022-12-29 PROBLEM — M48.02 SPINAL STENOSIS OF CERVICAL REGION: Status: ACTIVE | Noted: 2018-10-02

## 2022-12-29 LAB
ALBUMIN SERPL BCG-MCNC: 4.3 G/DL (ref 3.5–5.2)
ALP SERPL-CCNC: 86 U/L (ref 35–104)
ALT SERPL W P-5'-P-CCNC: 28 U/L (ref 10–35)
ANION GAP SERPL CALCULATED.3IONS-SCNC: 10 MMOL/L (ref 7–15)
AST SERPL W P-5'-P-CCNC: 27 U/L (ref 10–35)
BILIRUB SERPL-MCNC: 0.2 MG/DL
BUN SERPL-MCNC: 19.8 MG/DL (ref 8–23)
CALCIUM SERPL-MCNC: 9.2 MG/DL (ref 8.8–10.2)
CHLORIDE SERPL-SCNC: 106 MMOL/L (ref 98–107)
CHOLEST SERPL-MCNC: 227 MG/DL
CREAT SERPL-MCNC: 0.78 MG/DL (ref 0.51–0.95)
DEPRECATED CALCIDIOL+CALCIFEROL SERPL-MC: 28 UG/L (ref 20–75)
DEPRECATED HCO3 PLAS-SCNC: 24 MMOL/L (ref 22–29)
GFR SERPL CREATININE-BSD FRML MDRD: 84 ML/MIN/1.73M2
GLUCOSE SERPL-MCNC: 94 MG/DL (ref 70–99)
HDLC SERPL-MCNC: 56 MG/DL
LDLC SERPL CALC-MCNC: 139 MG/DL
NONHDLC SERPL-MCNC: 171 MG/DL
POTASSIUM SERPL-SCNC: 4.9 MMOL/L (ref 3.4–5.3)
PROT SERPL-MCNC: 7.1 G/DL (ref 6.4–8.3)
SODIUM SERPL-SCNC: 140 MMOL/L (ref 136–145)
TRIGL SERPL-MCNC: 160 MG/DL

## 2022-12-29 PROCEDURE — 82306 VITAMIN D 25 HYDROXY: CPT

## 2022-12-29 PROCEDURE — 99396 PREV VISIT EST AGE 40-64: CPT | Mod: 25 | Performed by: NURSE PRACTITIONER

## 2022-12-29 PROCEDURE — 99213 OFFICE O/P EST LOW 20 MIN: CPT | Mod: 25 | Performed by: NURSE PRACTITIONER

## 2022-12-29 PROCEDURE — 90471 IMMUNIZATION ADMIN: CPT | Performed by: NURSE PRACTITIONER

## 2022-12-29 PROCEDURE — 90682 RIV4 VACC RECOMBINANT DNA IM: CPT | Performed by: NURSE PRACTITIONER

## 2022-12-29 PROCEDURE — G0145 SCR C/V CYTO,THINLAYER,RESCR: HCPCS | Performed by: NURSE PRACTITIONER

## 2022-12-29 PROCEDURE — 80053 COMPREHEN METABOLIC PANEL: CPT

## 2022-12-29 PROCEDURE — 36415 COLL VENOUS BLD VENIPUNCTURE: CPT

## 2022-12-29 PROCEDURE — 87624 HPV HI-RISK TYP POOLED RSLT: CPT | Performed by: NURSE PRACTITIONER

## 2022-12-29 PROCEDURE — 80061 LIPID PANEL: CPT

## 2022-12-29 ASSESSMENT — ENCOUNTER SYMPTOMS
COUGH: 0
NAUSEA: 0
PALPITATIONS: 0
SORE THROAT: 0
WEAKNESS: 0
HEMATOCHEZIA: 0
ARTHRALGIAS: 1
PARESTHESIAS: 0
HEADACHES: 0
CONSTIPATION: 0
FREQUENCY: 0
HEARTBURN: 0
ABDOMINAL PAIN: 0
DIARRHEA: 0
JOINT SWELLING: 0
DIZZINESS: 0
CHILLS: 0
HEMATURIA: 0
EYE PAIN: 0
SHORTNESS OF BREATH: 0
BREAST MASS: 0
MYALGIAS: 0
NERVOUS/ANXIOUS: 0
DYSURIA: 0
FEVER: 0

## 2022-12-29 ASSESSMENT — PATIENT HEALTH QUESTIONNAIRE - PHQ9
SUM OF ALL RESPONSES TO PHQ QUESTIONS 1-9: 6
SUM OF ALL RESPONSES TO PHQ QUESTIONS 1-9: 6
10. IF YOU CHECKED OFF ANY PROBLEMS, HOW DIFFICULT HAVE THESE PROBLEMS MADE IT FOR YOU TO DO YOUR WORK, TAKE CARE OF THINGS AT HOME, OR GET ALONG WITH OTHER PEOPLE: NOT DIFFICULT AT ALL

## 2022-12-29 ASSESSMENT — PAIN SCALES - GENERAL: PAINLEVEL: NO PAIN (0)

## 2022-12-29 NOTE — PROGRESS NOTES
SUBJECTIVE:   CC: Korin is an 62 year old who presents for preventive health visit.   Patient has been advised of split billing requirements and indicates understanding: Yes  Healthy Habits:     Getting at least 3 servings of Calcium per day:  Yes    Bi-annual eye exam:  Yes    Dental care twice a year:  Yes    Sleep apnea or symptoms of sleep apnea:  Daytime drowsiness and Excessive snoring    Diet:  Regular (no restrictions)    Frequency of exercise:  None    Taking medications regularly:  Yes    Medication side effects:  Not applicable    PHQ-2 Total Score: 0    Additional concerns today:  No    Snoring: Has been a heavy snorer for years. Often does not feel rested in the morning when she wakes up; falls asleep easily during the day if she is in the car. Neither parents/siblings had sleep apnea. Has never had a sleep study.     Rheumatoid arthritis- joints have been more sore lately. Elbows and neck. Weather changes seem to make it worse.    Mood stable; feels like medication working well to keep mood stable. No side effects that she has noticed.    Today's PHQ-2 Score:   PHQ-2 ( 1999 Pf[employer]) 12/29/2022   Q1: Little interest or pleasure in doing things 0   Q2: Feeling down, depressed or hopeless 0   PHQ-2 Score 0   PHQ-2 Total Score (12-17 Years)- Positive if 3 or more points; Administer PHQ-A if positive -   Q1: Little interest or pleasure in doing things Not at all   Q2: Feeling down, depressed or hopeless Not at all   PHQ-2 Score 0   Answers for HPI/ROS submitted by the patient on 12/29/2022  If you checked off any problems, how difficult have these problems made it for you to do your work, take care of things at home, or get along with other people?: Not difficult at all  PHQ9 TOTAL SCORE: 6      Have you ever done Advance Care Planning? (For example, a Health Directive, POLST, or a discussion with a medical provider or your loved ones about your wishes): No, advance care planning information given to  patient to review.  Patient plans to discuss their wishes with loved ones or provider.      Social History     Tobacco Use     Smoking status: Never     Smokeless tobacco: Never   Substance Use Topics     Alcohol use: Not Currently     Comment: 0-1 per week     If you drink alcohol do you typically have >3 drinks per day or >7 drinks per week? No    Alcohol Use 12/29/2022   Prescreen: >3 drinks/day or >7 drinks/week? No   No flowsheet data found.    Reviewed orders with patient.  Reviewed health maintenance and updated orders accordingly - Yes  Lab work is in process  Labs reviewed in EPIC    Breast Cancer Screening:    FHS-7:   Breast CA Risk Assessment (FHS-7) 12/29/2022   Did any of your first-degree relatives have breast or ovarian cancer? Yes   Did any of your relatives have bilateral breast cancer? No   Did any man in your family have breast cancer? No   Did any woman in your family have breast and ovarian cancer? Yes   Did any woman in your family have breast cancer before age 50 y? Yes   Do you have 2 or more relatives with breast and/or ovarian cancer? No   Do you have 2 or more relatives with breast and/or bowel cancer? No     click delete button to remove this line now  Mammogram Screening: Recommended mammography every 1-2 years with patient discussion and risk factor consideration  Pertinent mammograms are reviewed under the imaging tab.    History of abnormal Pap smear: NO - age 30-65 PAP every 5 years with negative HPV co-testing recommended     Reviewed and updated as needed this visit by clinical staff                  Reviewed and updated as needed this visit by Provider                     Review of Systems   Constitutional: Negative for chills and fever.   HENT: Negative for congestion, ear pain, hearing loss and sore throat.    Eyes: Negative for pain and visual disturbance.   Respiratory: Negative for cough and shortness of breath.    Cardiovascular: Negative for chest pain, palpitations and  peripheral edema.   Gastrointestinal: Negative for abdominal pain, constipation, diarrhea, heartburn, hematochezia and nausea.   Breasts:  Negative for tenderness, breast mass and discharge.   Genitourinary: Negative for dysuria, frequency, genital sores, hematuria, pelvic pain, urgency, vaginal bleeding and vaginal discharge.   Musculoskeletal: Positive for arthralgias. Negative for joint swelling and myalgias.   Skin: Negative for rash.   Neurological: Negative for dizziness, weakness, headaches and paresthesias.   Psychiatric/Behavioral: Negative for mood changes. The patient is not nervous/anxious.      CONSTITUTIONAL: NEGATIVE for fever, chills, change in weight  INTEGUMENTARU/SKIN: NEGATIVE for worrisome rashes, moles or lesions  EYES: NEGATIVE for vision changes or irritation  ENT: NEGATIVE for ear, mouth and throat problems  RESP: NEGATIVE for significant cough or SOB  BREAST: NEGATIVE for masses, tenderness or discharge  CV: NEGATIVE for chest pain, palpitations or peripheral edema  GI: NEGATIVE for nausea, abdominal pain, heartburn, or change in bowel habits  : NEGATIVE for unusual urinary or vaginal symptoms. Periods are regular.  MUSCULOSKELETAL: NEGATIVE for significant arthralgias or myalgia  NEURO: NEGATIVE for weakness, dizziness or paresthesias  PSYCHIATRIC: NEGATIVE for changes in mood or affect     OBJECTIVE:   There were no vitals taken for this visit.  Physical Exam  GENERAL: healthy, alert and no distress  EYES: Eyes grossly normal to inspection, PERRL and conjunctivae and sclerae normal  NECK: no adenopathy, no asymmetry, masses, or scars and thyroid normal to palpation  RESP: lungs clear to auscultation - no rales, rhonchi or wheezes  BREAST: normal without masses, tenderness or nipple discharge and no palpable axillary masses or adenopathy  CV: regular rate and rhythm, normal S1 S2, no S3 or S4, no murmur, click or rub, no peripheral edema and peripheral pulses strong  ABDOMEN: soft,  nontender, no hepatosplenomegaly, no masses and bowel sounds normal   (female): normal female external genitalia, normal urethral meatus, vaginal mucosa, normal cervix/adnexa/uterus without masses or discharge  MS: no gross musculoskeletal defects noted, no edema  SKIN: no suspicious lesions or rashes  PSYCH: mentation appears normal, affect normal/bright    Diagnostic Test Results:  Labs reviewed in Epic  No results found for this or any previous visit (from the past 24 hour(s)).    ASSESSMENT/PLAN:   (Z00.00) Routine history and physical examination of adult  (primary encounter diagnosis)  Comment:   Plan:     (Z12.31) Encounter for screening mammogram for breast cancer  Comment:   Plan: *MA Screening Digital Bilateral            (Z12.4) Screening for cervical cancer  Comment:   Plan: Pap screen with HPV - recommended age 30 - 65         years            (Z12.11) Screening for colon cancer  Comment:   Plan:     (R06.83) Snoring  Comment:   Plan: Adult Sleep Eval & Management          Referral            (M05.79) Rheumatoid arthritis involving multiple sites with positive rheumatoid factor (H)  Comment:   Plan: diclofenac (VOLTAREN) 1 % topical gel,         Comprehensive metabolic panel (BMP + Alb, Alk         Phos, ALT, AST, Total. Bili, TP)            (Z13.220) Screening cholesterol level  Comment:   Plan: Lipid panel reflex to direct LDL Fasting            (E55.9) Vitamin D deficiency  Comment:   Plan: **Vitamin D Deficiency FUTURE 2mo              Patient has been advised of split billing requirements and indicates understanding: Yes      COUNSELING:  Reviewed preventive health counseling, as reflected in patient instructions       Regular exercise       Healthy diet/nutrition        She reports that she has never smoked. She has never used smokeless tobacco.      KAHLIL Tracy CNP  [facility]

## 2022-12-30 ENCOUNTER — TELEPHONE (OUTPATIENT)
Dept: FAMILY MEDICINE | Facility: CLINIC | Age: 62
End: 2022-12-30

## 2022-12-30 DIAGNOSIS — M05.79 RHEUMATOID ARTHRITIS INVOLVING MULTIPLE SITES WITH POSITIVE RHEUMATOID FACTOR (H): ICD-10-CM

## 2022-12-30 NOTE — TELEPHONE ENCOUNTER
Clarification Request:   Voltaren Gel 1% is not covered     Covered alternatives Meloxicam tab 7.5mg, Nabumetone tab 500mg, etodolac cap 200mg

## 2022-12-30 NOTE — TELEPHONE ENCOUNTER
RX Clarification  RX is not covered. Requesting approval to dispense alternative.    1. MELOXICAM TAB 7.5 MG  2.NABUMETONE  MG   3 ETODOLAC 200 MG      Order # [id number]    If not, they are asking to submit PA    diclofenac (VOLTAREN) 1 % topical gel

## 2023-01-02 LAB
BKR LAB AP GYN ADEQUACY: NORMAL
BKR LAB AP GYN INTERPRETATION: NORMAL
BKR LAB AP HPV REFLEX: NORMAL
BKR LAB AP PREVIOUS ABNORMAL: NORMAL
PATH REPORT.COMMENTS IMP SPEC: NORMAL
PATH REPORT.COMMENTS IMP SPEC: NORMAL
PATH REPORT.RELEVANT HX SPEC: NORMAL

## 2023-01-03 LAB
HUMAN PAPILLOMA VIRUS 16 DNA: NEGATIVE
HUMAN PAPILLOMA VIRUS 18 DNA: NEGATIVE
HUMAN PAPILLOMA VIRUS FINAL DIAGNOSIS: NORMAL
HUMAN PAPILLOMA VIRUS OTHER HR: NEGATIVE

## 2023-01-04 ENCOUNTER — ANCILLARY PROCEDURE (OUTPATIENT)
Dept: MAMMOGRAPHY | Facility: CLINIC | Age: 63
End: 2023-01-04
Attending: NURSE PRACTITIONER
Payer: COMMERCIAL

## 2023-01-04 DIAGNOSIS — Z12.31 ENCOUNTER FOR SCREENING MAMMOGRAM FOR BREAST CANCER: ICD-10-CM

## 2023-01-04 PROCEDURE — 77067 SCR MAMMO BI INCL CAD: CPT | Mod: TC | Performed by: RADIOLOGY

## 2023-01-04 PROCEDURE — 77063 BREAST TOMOSYNTHESIS BI: CPT | Mod: TC | Performed by: RADIOLOGY

## 2023-03-05 DIAGNOSIS — F33.9 RECURRENT MAJOR DEPRESSIVE DISORDER, REMISSION STATUS UNSPECIFIED (H): ICD-10-CM

## 2023-03-07 RX ORDER — CITALOPRAM HYDROBROMIDE 10 MG/1
TABLET ORAL
Qty: 90 TABLET | Refills: 3 | Status: SHIPPED | OUTPATIENT
Start: 2023-03-07 | End: 2024-01-03

## 2023-05-12 ENCOUNTER — VIRTUAL VISIT (OUTPATIENT)
Dept: SLEEP MEDICINE | Facility: CLINIC | Age: 63
End: 2023-05-12
Attending: NURSE PRACTITIONER
Payer: COMMERCIAL

## 2023-05-12 VITALS — BODY MASS INDEX: 29.02 KG/M2 | WEIGHT: 170 LBS | HEIGHT: 64 IN

## 2023-05-12 DIAGNOSIS — E66.3 OVERWEIGHT (BMI 25.0-29.9): ICD-10-CM

## 2023-05-12 DIAGNOSIS — G47.9 SLEEP DISTURBANCE: Primary | ICD-10-CM

## 2023-05-12 DIAGNOSIS — G47.00 INSOMNIA, UNSPECIFIED TYPE: ICD-10-CM

## 2023-05-12 DIAGNOSIS — R40.0 DAYTIME SOMNOLENCE: ICD-10-CM

## 2023-05-12 DIAGNOSIS — R06.83 SNORING: ICD-10-CM

## 2023-05-12 PROBLEM — R89.9 ABNORMAL LABORATORY TEST: Status: ACTIVE | Noted: 2017-09-14

## 2023-05-12 PROBLEM — M25.512 CHRONIC LEFT SHOULDER PAIN: Status: ACTIVE | Noted: 2018-09-23

## 2023-05-12 PROBLEM — G89.29 CHRONIC LEFT SHOULDER PAIN: Status: ACTIVE | Noted: 2018-09-23

## 2023-05-12 PROBLEM — M50.30 DEGENERATION OF CERVICAL INTERVERTEBRAL DISC: Status: ACTIVE | Noted: 2023-05-12

## 2023-05-12 PROCEDURE — 99204 OFFICE O/P NEW MOD 45 MIN: CPT | Mod: VID | Performed by: NURSE PRACTITIONER

## 2023-05-12 ASSESSMENT — SLEEP AND FATIGUE QUESTIONNAIRES
HOW LIKELY ARE YOU TO NOD OFF OR FALL ASLEEP WHILE SITTING QUIETLY AFTER LUNCH WITHOUT ALCOHOL: WOULD NEVER DOZE
HOW LIKELY ARE YOU TO NOD OFF OR FALL ASLEEP WHILE WATCHING TV: HIGH CHANCE OF DOZING
HOW LIKELY ARE YOU TO NOD OFF OR FALL ASLEEP WHILE LYING DOWN TO REST IN THE AFTERNOON WHEN CIRCUMSTANCES PERMIT: MODERATE CHANCE OF DOZING
HOW LIKELY ARE YOU TO NOD OFF OR FALL ASLEEP WHILE SITTING AND TALKING TO SOMEONE: WOULD NEVER DOZE
HOW LIKELY ARE YOU TO NOD OFF OR FALL ASLEEP WHILE SITTING AND READING: MODERATE CHANCE OF DOZING
HOW LIKELY ARE YOU TO NOD OFF OR FALL ASLEEP IN A CAR, WHILE STOPPED FOR A FEW MINUTES IN TRAFFIC: WOULD NEVER DOZE
HOW LIKELY ARE YOU TO NOD OFF OR FALL ASLEEP WHILE SITTING INACTIVE IN A PUBLIC PLACE: WOULD NEVER DOZE
HOW LIKELY ARE YOU TO NOD OFF OR FALL ASLEEP WHEN YOU ARE A PASSENGER IN A CAR FOR AN HOUR WITHOUT A BREAK: MODERATE CHANCE OF DOZING

## 2023-05-12 ASSESSMENT — PAIN SCALES - GENERAL: PAINLEVEL: NO PAIN (0)

## 2023-05-12 NOTE — PATIENT INSTRUCTIONS
"Medicare and Medicaid patients starting on CPAP or biPAP on or after 5/12/2023  Medicare patients should schedule an IN PERSON CLINIC appointment to provide your CPAP/biPAP usage data to a provider within 90 days of starting CPAP  Virtual visits are no longer allowed for this visit for Medicare                MY TREATMENT INFORMATION FOR SLEEP APNEA-  Korin Islas    DOCTOR : KAHLIL Edward CNP    Am I having a sleep study at a sleep center?  --->Due to normal delays, you will be contacted within 2-4 weeks to schedule    Am I having a home sleep study?  --->Watch the video for the device you are using:    -/drop off device-   https://www.50 [employer]es.com/watch?v=yGGFBdELGhk    -Disposable device sent out require phone/computer application-   https://www.[employer].com/watch?v=BCce_vbiwxE      Frequently asked questions:  1. What is Obstructive Sleep Apnea (GUSTAVO)? GUSTAVO is the most common type of sleep apnea. Apnea means, \"without breath.\"  Apnea is most often caused by narrowing or collapse of the upper airway as muscles relax during sleep.   Almost everyone has occasional apneas. Most people with sleep apnea have had brief interruptions at night frequently for many years.  The severity of sleep apnea is related to how frequent and severe the events are.   2. What are the consequences of GUSTAVO? Symptoms include: feeling sleepy during the day, snoring loudly, gasping or stopping of breathing, trouble sleeping, and occasionally morning headaches or heartburn at night.  Sleepiness can be serious and even increase the risk of falling asleep while driving. Other health consequences may include development of high blood pressure and other cardiovascular disease in persons who are susceptible. Untreated GUSTAVO  can contribute to heart disease, stroke and diabetes.   3. What are the treatment options? In most situations, sleep apnea is a lifelong disease that must be managed with daily therapy. Medications are not " effective for sleep apnea and surgery is generally not considered until other therapies have been tried. Your treatment is your choice . Continuous Positive Airway (CPAP) works right away and is the therapy that is effective in nearly everyone. An oral device to hold your jaw forward is usually the next most reliable option. Other options include postioning devices (to keep you off your back), weight loss, and surgery including a tongue pacing device. There is more detail about some of these options below.  4. Are my sleep studies covered by insurance? Although we will request verification of coverage, we advise you also check in advance of the study to ensure there is coverage.    Important tips for those choosing CPAP and similar devices  For new devices, sign up for device MARS to monitor your device for your followup visits  We encourage you to utilize the [place] mars or website (myAir web (resmed.com) ) to monitor your therapy progress and share the data with your healthcare team when you discuss your sleep apnea.                                                    Know your equipment:  CPAP is continuous positive airway pressure that prevents obstructive sleep apnea by keeping the throat from collapsing while you are sleeping. In most cases, the device is  smart  and can slowly self-adjusts if your throat collapses and keeps a record every day of how well you are treated-this information is available to you and your care team.  BPAP is bilevel positive airway pressure that keeps your throat open and also assists each breath with a pressure boost to maintain adequate breathing.  Special kinds of BPAP are used in patients who have inadequate breathing from lung or heart disease. In most cases, the device is  smart  and can slowly self-adjusts to assist breathing. Like CPAP, the device keeps a record of how well you are treated.  Your mask is your connection to the device. You get to choose what feels most  comfortable and the staff will help to make sure if fits. Here: are some examples of the different masks that are available:       Key points to remember on your journey with sleep apnea:  Sleep study.  PAP devices often need to be adjusted during a sleep study to show that they are effective and adjusted right.  Good tips to remember: Try wearing just the mask during a quiet time during the day so your body adapts to wearing it. A humidifier is recommended for comfort in most cases to prevent drying of your nose and throat. Allergy medication from your provider may help you if you are having nasal congestion.  Getting settled-in. It takes more than one night for most of us to get used to wearing a mask. Try wearing just the mask during a quiet time during the day so your body adapts to wearing it. A humidifier is recommended for comfort in most cases. Our team will work with you carefully on the first day and will be in contact within 4 days and again at 2 and 4 weeks for advice and remote device adjustments. Your therapy is evaluated by the device each day.   Use it every night. The more you are able to sleep naturally for 7-8 hours, the more likely you will have good sleep and to prevent health risks or symptoms from sleep apnea. Even if you use it 4 hours it helps. Occasionally all of us are unable to use a medical therapy, in sleep apnea, it is not dangerous to miss one night.   Communicate. Call our skilled team on the number provided on the first day if your visit for problems that make it difficult to wear the device. Over 2 out of 3 patients can learn to wear the device long-term with help from our team. Remember to call our team or your sleep providers if you are unable to wear the device as we may have other solutions for those who cannot adapt to mask CPAP therapy. It is recommended that you sleep your sleep provider within the first 3 months and yearly after that if you are not having problems.   Use it  for your health. We encourage use of CPAP masks during daytime quiet periods to allow your face and brain to adapt to the sensation of CPAP so that it will be a more natural sensation to awaken to at night or during naps. This can be very useful during the first few weeks or months of adapting to CPAP though it does not help medically to wear CPAP during wakefulness and  should not be used as a strategy just to meet guidelines.  Take care of your equipment. Make sure you clean your mask and tubing using directions every day and that your filter and mask are replaced as recommended or if they are not working.     BESIDES CPAP, WHAT OTHER THERAPIES ARE THERE?    Positioning Device  Positioning devices are generally used when sleep apnea is mild and only occurs on your back.This example shows a pillow that straps around the waist. It may be appropriate for those whose sleep study shows milder sleep apnea that occurs primarily when lying flat on one's back. Preliminary studies have shown benefit but effectiveness at home may need to be verified by a home sleep test. These devices are generally not covered by medical insurance.  Examples of devices that maintain sleeping on the back to prevent snoring and mild sleep apnea.    Belt type body positioner  http://[employer]/    Electronic reminder  http://nightshifttherapy.com/            Oral Appliance  What is oral appliance therapy?  An oral appliance device fits on your teeth at night like a retainer used after having braces. The device is made by a specialized dentist and requires several visits over 1-2 months before a manufactured device is made to fit your teeth and is adjusted to prevent your sleep apnea. Once an oral device is working properly, snoring should be improved. A home sleep test may be recommended at that time if to determine whether the sleep apnea is adequately treated.       Some things to remember:  -Oral devices are often, but not always, covered  by your medical insurance. Be sure to check with your insurance provider.   -If you are referred for oral therapy, you will be given a list of specialized dentists to consider or you may choose to visit the Web site of the American Academy of Dental Sleep Medicine  -Oral devices are less likely to work if you have severe sleep apnea or are extremely overweight.     More detailed information  An oral appliance is a small acrylic device that fits over the upper and lower teeth  (similar to a retainer or a mouth guard). This device slightly moves jaw forward, which moves the base of the tongue forward, opens the airway, improves breathing for effective treat snoring and obstructive sleep apnea in perhaps 7 out of 10 people .  The best working devices are custom-made by a dental device  after a mold is made of the teeth 1, 2, 3.  When is an oral appliance indicated?  Oral appliance therapy is recommended as a first-line treatment for patients with primary snoring, mild sleep apnea, and for patients with moderate sleep apnea who prefer appliance therapy to use of CPAP4, 5. Severity of sleep apnea is determined by sleep testing and is based on the number of respiratory events per hour of sleep.   How successful is oral appliance therapy?  The success rate of oral appliance therapy in patients with mild sleep apnea is 75-80% while in patients with moderate sleep apnea it is 50-70%. The chance of success in patients with severe sleep apnea is 40-50%. The research also shows that oral appliances have a beneficial effect on the cardiovascular health of GUSTAVO patients at the same magnitude as CPAP therapy7.  Oral appliances should be a second-line treatment in cases of severe sleep apnea, but if not completely successful then a combination therapy utilizing CPAP plus oral appliance therapy may be effective. Oral appliances tend to be effective in a broad range of patients although studies show that the patients who  have the highest success are females, younger patients, those with milder disease, and less severe obesity. 3, 6.   Finding a dentist that practices dental sleep medicine  Specific training is available through the American Academy of Dental Sleep Medicine for dentists interested in working in the field of sleep. To find a dentist who is educated in the field of sleep and the use of oral appliances, near you, visit the Web site of the American Academy of Dental Sleep Medicine.    References  1. Ami, et al. Objectively measured vs self-reported compliance during oral appliance therapy for sleep-disordered breathing. Chest 2013; 144(5): 2242-3621.  2. Sinan, et al. Objective measurement of compliance during oral appliance therapy for sleep-disordered breathing. Thorax 2013; 68(1): 91-96.  3. Raul et al. Mandibular advancement devices in 620 men and women with GUSTAVO and snoring: tolerability and predictors of treatment success. Chest 2004; 125: 0537-3270.  4. Brittany, et al. Oral appliances for snoring and GUSTAVO: a review. Sleep 2006; 29: 244-262.  5. Guillermina et al. Oral appliance treatment for GUSTAVO: an update. J Clin Sleep Med 2014; 10(2): 215-227.  6. Jigna et al. Predictors of OSAH treatment outcome. J Dent Res 2007; 86: 8973-8578.      Weight Loss:    Weight loss is a long-term strategy that may improve sleep apnea in some patients.    Weight management is a personal decision and the decision should be based on your interest and the potential benefits.  If you are interested in exploring weight loss strategies, the following discussion covers the impact on weight loss on sleep apnea and the approaches that may be successful.    Being overweight does not necessarily mean you will have health consequences.  Those who have BMI over 35 or over 27 with existing medical conditions carries greater risk.   Weight loss decreases severity of sleep apnea in most people with obesity. For those with mild  obesity who have developed snoring with weight gain, even 15-30 pound weight loss can improve and occasionally eliminate sleep apnea.  Structured and life-long dietary and health habits are necessary to lose weight and keep healthier weight levels.     Though there may be significant health benefits from weight loss, long-term weight loss is very difficult to achieve- studies show success with dietary management in less than 10% of people. In addition, substantial weight loss may require years of dietary control and may be difficult if patients have severe obesity. In these cases, surgical management may be considered.  Finally, older individuals who have tolerated obesity without health complications may be less likely to benefit from weight loss strategies.      Your BMI is Body mass index is 29.64 kg/m .    What is BMI?  Body mass index (BMI) is one way to tell whether you are at a healthy weight, overweight, or obese. It measures your weight in relation to your height.  A BMI of 18.5 to 24.9 is in the healthy range. A person with a BMI of 25 to 29.9 is considered overweight, and someone with a BMI of 30 or greater is considered obese.  Another way to find out if you are at risk for health problems caused by overweight and obesity is to measure your waist. If you are a woman and your waist is more than 35 inches, or if you are a man and your waist is more than 40 inches, your risk of disease may be higher.  More than two-thirds of American adults are considered overweight or obese. Being overweight or obese increases the risk for further weight gain.  Excess weight may lead to heart disease and diabetes. Creating and following plans for healthy eating and physical activity may help you improve your health.    Methods for maintaining or losing weight.  Weight control is part of healthy lifestyle and includes exercise, emotional health, and healthy eating habits.  Careful eating habits lifelong is the mainstay of  weight control.  Though there are significant health benefits from weight loss, long-term weight loss with diet alone may be very difficult to achieve- studies show long-term success with dietary management in less than 10% of people. Attaining a healthy weight may be especially difficult to achieve in those with severe obesity. In some cases, medications, devices and surgical management might be considered.    What can you do?  If you are overweight or obese and are interested in methods for weight loss, you should discuss this with your provider. In addition, we recommend that you review healthy life styles and methods for weight loss available through the National Institutes of Health patient information sites:   http://win.niddk.nih.gov/publications/index.htm    Surgery:    Surgery for obstructive sleep apnea is considered generally only when other therapies fail to work. Surgery may be discussed with you if you are having a difficult time tolerating CPAP and or when there is an abnormal structure that requires surgical correction.  Nose and throat surgeries often enlarge the airway to prevent collapse.  Most of these surgeries create pain for 1-2 weeks and up to half of the most common surgeries are not effective throughout life.  You should carefully discuss the benefits and drawbacks to surgery with your sleep provider and surgeon to determine if it is the best solution for you.   More information  Surgery for GUSTAVO is directed at areas that are responsible for narrowing or complete obstruction of the airway during sleep.  There are a wide range of procedures available to enlarge and/or stabilize the airway to prevent blockage of breathing in the three major areas where it can occur: the palate, tongue, and nasal regions.  Successful surgical treatment depends on the accurate identification of the factors responsible for obstructive sleep apnea in each person.  A personalized approach is required because there  is no single treatment that works well for everyone.  Because of anatomic variation, consultation with an examination by a sleep surgeon is a critical first step in determining what surgical options are best for each patient.  In some cases, examination during sedation may be recommended in order to guide the selection of procedures.  Patients will be counseled about risks and benefits as well as the typical recovery course after surgery. Surgery is typically not a cure for a person s GUSTAVO.  However, surgery will often significantly improve one s GUSTAVO severity (termed  success rate ).  Even in the absence of a cure, surgery will decrease the cardiovascular risk associated with OSA7; improve overall quality of life8 (sleepiness, functionality, sleep quality, etc).      Palate Procedures:  Patients with GUSTAVO often have narrowing of their airway in the region of their tonsils and uvula.  The goals of palate procedures are to widen the airway in this region as well as to help the tissues resist collapse.  Modern palate procedure techniques focus on tissue conservation and soft tissue rearrangement, rather than tissue removal.  Often the uvula is preserved in this procedure. Residual sleep apnea is common in patient after pharyngoplasty with an average reduction in sleep apnea events of 33%2.      Tongue Procedures:  ExamWhile patients are awake, the muscles that surround the throat are active and keep this region open for breathing. These muscles relax during sleep, allowing the tongue and other structures to collapse and block breathing.  There are several different tongue procedures available.  Selection of a tongue base procedure depends on characteristics seen on physical exam.  Generally, procedures are aimed at removing bulky tissues in this area or preventing the back of the tongue from falling back during sleep.  Success rates for tongue surgery range from 50-62%3.    Hypoglossal Nerve Stimulation:  Hypoglossal  nerve stimulation has recently received approval from the [place] Food and Drug Administration for the treatment of obstructive sleep apnea.  This is based on research showing that the system was safe and effective in treating sleep apnea6.  Results showed that the median AHI score decreased 68%, from 29.3 to 9.0. This therapy uses an implant system that senses breathing patterns and delivers mild stimulation to airway muscles, which keeps the airway open during sleep.  The system consists of three fully implanted components: a small generator (similar in size to a pacemaker), a breathing sensor, and a stimulation lead.  Using a small handheld remote, a patient turns the therapy on before bed and off upon awakening.    Candidates for this device must be greater than 18 years of age, have moderate to severe GUSTAVO (AHI between 15-65), BMI less than 35, have tried CPAP/oral appliance for at least 8 weeks without success, and have appropriate upper airway anatomy (determined by a sleep endoscopy performed by Dr. Ashu Paz).    Hypoglossal Nerve Stimulation Pathway:    The sleep surgeon s office will work with the patient through the insurance prior-authorization process (including communications and appeals).    Nasal Procedures:  Nasal obstruction can interfere with nasal breathing during the day and night.  Studies have shown that relief of nasal obstruction can improve the ability of some patients to tolerate positive airway pressure therapy for obstructive sleep apnea1.  Treatment options include medications such as nasal saline, topical corticosteroid and antihistamine sprays, and oral medications such as antihistamines or decongestants. Non-surgical treatments can include external nasal dilators for selected patients. If these are not successful by themselves, surgery can improve the nasal airway either alone or in combination with these other options.      Combination Procedures:  Combination of surgical  procedures and other treatments may be recommended, particularly if patients have more than one area of narrowing or persistent positional disease.  The success rate of combination surgery ranges from 66-80%2,3.    References  Lizandro MARIE. The Role of the Nose in Snoring and Obstructive Sleep Apnoea: An Update.  Eur Arch Otorhinolaryngol. 2011; 268: 1365-73.   Avtar SM; Nishant JA; Tess JR; Pallanch JF; Medina MB; Edwina SG; Onofre BOWERS. Surgical modifications of the upper airway for obstructive sleep apnea in adults: a systematic review and meta-analysis. SLEEP 2010;33(10):9076-6504. Hermilo HEARN. Hypopharyngeal surgery in obstructive sleep apnea: an evidence-based medicine review.  Arch Otolaryngol Head Neck Surg. 2006 Feb;132(2):206-13.  Sinan YH1, Johanna Y, Basil FELIBERTO. The efficacy of anatomically based multilevel surgery for obstructive sleep apnea. Otolaryngol Head Neck Surg. 2003 Oct;129(4):327-35.  Kezirian E, Goldberg A. Hypopharyngeal Surgery in Obstructive Sleep Apnea: An Evidence-Based Medicine Review. Arch Otolaryngol Head Neck Surg. 2006 Feb;132(2):206-13.  Antonietta PJ et al. Upper-Airway Stimulation for Obstructive Sleep Apnea.  N Engl J Med. 2014 Jan 9;370(2):139-49.  Caroline Y et al. Increased Incidence of Cardiovascular Disease in Middle-aged Men with Obstructive Sleep Apnea. Am J Respir Crit Care Med; 2002 166: 159-165  Portillo EM et al. Studying Life Effects and Effectiveness of Palatopharyngoplasty (SLEEP) study: Subjective Outcomes of Isolated Uvulopalatopharyngoplasty. Otolaryngol Head Neck Surg. 2011; 144: 623-631.        WHAT IF I ONLY HAVE SNORING?    Mandibular advancement devices, lateral sleep positioning, long-term weight loss and treatment of nasal allergies have been shown to improve snoring.  Exercising tongue muscles with a game (https://apps.FANCRU/us/mars/soundly-reduce-snoring/kr2491787283) or stimulating the tongue during the day with a device (https://doi.org/10.1130/sjk39934396) have  improved snoring in some individuals.    Remember to Drive Safe... Drive Alive     Sleep health profoundly affects your health, mood, and your safety.  Thirty three percent of the population (one in three of us) is not getting enough sleep and many have a sleep disorder. Not getting enough sleep or having an untreated / undertreated sleep condition may make us sleepy without even knowing it. In fact, our driving could be dramatically impaired due to our sleep health. As your provider, here are some things I would like you to know about driving:     Here are some warning signs for impairment and dangerous drowsy driving:              -Having been awake more than 16 hours               -Looking tired               -Eyelid drooping              -Head nodding (it could be too late at this point)              -Driving for more than 30 minutes     Some things you could do to make the driving safer if you are experiencing some drowsiness:              -Stop driving and rest              -Call for transportation              -Make sure your sleep disorder is adequately treated     Some things that have been shown NOT to work when experiencing drowsiness while driving:              -Turning on the radio              -Opening windows              -Eating any  distracting  /  entertaining  foods (e.g., sunflower seeds, candy, or any other)              -Talking on the phone      Your decision may not only impact your life, but also the life of others. Please, remember to drive safe for yourself and all of us.

## 2023-05-12 NOTE — NURSING NOTE
Is the patient currently in the state of [place]? YES    Visit mode:VIDEO    If the visit is dropped, the patient can be reconnected by: VIDEO VISIT: Send to e-mail at: [email or web address]    Will anyone else be joining the visit? NO      How would you like to obtain your AVS? MyChart    Are changes needed to the allergy or medication list? NO    Reason for visit: Video Visit (Snoring )      Has patient had flu shot for current/most recent flu season? If so, when? Yes: 12/2022    Marisa SANCHEZ

## 2023-05-12 NOTE — PROGRESS NOTES
Virtual Visit Details    Type of service:  Video Visit     Originating Location (pt. Location): Home    Distant Location (provider location):  Off-site  Platform used for Video Visit: [facility]       Outpatient Sleep Medicine Consultation:      Name: Korin Islas MRN# [id number]   Age: 62 year old YOB: 1960     Date of Consultation: May 12, 2023  Consultation is requested by: KAHLIL Tracy CNP  [address]  [place] [address] Kavitha Singleton  Primary care provider: Kavitha Singleton       Reason for Sleep Consult:     Korin Islas is sent by Kavitha Singleton for a sleep consultation regarding snoring, possible GUSTAVO.    Patient s Reason for visit  Korin Islas main reason for visit: Trouble sraying asleep. Snore.  cant sleep  Patient states problem(s) started: A couple years ago. Getting worse  Korin Islas's goals for this visit: Get resolution           Assessment and Plan:     Summary Sleep Diagnoses:  1. Sleep disturbance  2. Snoring  3. Daytime somnolence  4. Insomnia, unspecified type  5. Overweight (BMI 25.0-29.9)  - Adult Sleep Eval & Management  Referral  - Comprehensive Sleep Study; Future      Comorbid Diagnoses:  1.  Rheumatoid arthritis  2.  Depression with anxiety  3.  Cervical DJD      Summary Recommendations:  1.  Recommend further evaluation with in lab polysomnography (PSG) for possible sleep disordered breathing.  Her STOP-BANG score is 3 which suggest a high risk of GUSTAVO using the enhanced version of the questionnaire.  Her symptoms are consistent with probable obstructive sleep apnea.  2.  Her insomnia severity index score is 15 which suggests moderate severity clinical insomnia.  This is likely associated with chronic multiple joint pains related to rheumatoid arthritis and cervical spinal stenosis/DJD.  She does follow with a rheumatologist for this.  3.  All potential therapeutic options including positive airway  pressure, mandibular advancing oral appliances, and surgical options were discussed. Also counseled about impact of weight loss on GUSTAVO.   4.  Follow-up in approximately 2 weeks after the sleep study to review the results and to determine next steps.    Orders Placed This Encounter   Procedures     Comprehensive Sleep Study         Summary Counseling:    Sleep Testing Reviewed  Obstructive Sleep Apnea Reviewed  Complications of Untreated Sleep Apnea Reviewed  Previous recent chart notes, lab results    Medical Decision-making:   Educational materials provided in instructions    Total time spent reviewing medical records, history and physical examination, review of previous testing and interpretation as well as documentation on this date: 50 minutes    CC: Kavitha Singleton          History of Present Illness:     Korin Islas is a 62-year-old female with a PMH pertinent for rheumatoid arthritis, HLD, depression with anxiety, cervical DJD, and overweight who presents today with symptoms of very loud snoring, wakes due to snoring/snort, morning headaches, difficulty staying asleep/sleep maintenance, daytime somnolence, recurring nightmares, limbs go numb with lateral sleep, and nonrestorative sleep for several years.  She was referred by her primary care provider for further evaluation of possible sleep disordered breathing.    Past Sleep Evaluations: No    SLEEP-WAKE SCHEDULE:     Work/School Days: Patient goes to school/work: Yes, works day hours - office work for [employer]   Usually gets into bed at By 9 or 10pm  Takes patient about Not long to fall asleep  Has trouble falling asleep I dont nights per week  Wakes up in the middle of the night Every 1-2 hours times.  Wakes up due to Snorting self awake;External stimuli (bed partner, pets, noise, etc);Use the bathroom  She has trouble falling back asleep Never times a week.   It usually takes Right away to 1/2 hour to get back to sleep  Patient is  usually up at 6am  Uses alarm: Yes    Weekends/Non-work Days/All Other Days:  Usually gets into bed at 9-10 pm   Takes patient about Right away, 5 minutes to fall asleep  Patient is usually up at 6am  Uses alarm: Yes    Sleep Need  Patient gets  6 hours sleep on average   Patient thinks she needs about 8 sleep    Korin Islas prefers to sleep in this position(s): Side   Patient states they do the following activities in bed: Watch TV    Naps  Patient takes a purposeful nap Never on purpose times a week and naps are usually 1/2 hour in duration  She feels better after a nap: Yes  She dozes off unintentionally Every night days per week  Patient has had a driving accident or near-miss due to sleepiness/drowsiness: No      SLEEP DISRUPTIONS:    Breathing/Snoring  Patient snores:Yes  Other people complain about her snoring: Yes  Patient has been told she stops breathing in her sleep:No  She has issues with the following: Morning headaches;Morning mouth dryness;Heartburn or reflux at night;Getting up to urinate more than once    Movement:  Patient gets pain, discomfort, with an urge to move:  Yes  It happens when she is resting:  Yes  It happens more at night:  Yes  Patient has been told she kicks her legs at night:  No     Behaviors in Sleep:  Korin Islas has experienced the following behaviors while sleeping: Recurring Nightmares  She has experienced sudden muscle weakness during the day: No      Is there anything else you would like your sleep provider to know: Toss and turn every hour or so. Body goes numb      CAFFEINE AND OTHER SUBSTANCES:    Patient consumes caffeinated beverages per day:  2-3 cups of coffee  Last caffeine use is usually: 11am  List of any prescribed or over the counter stimulants that patient takes: None  List of any prescribed or over the counter sleep medication patient takes:    List of previous sleep medications that patient has tried:    Patient drinks alcohol to help them sleep:  No  Patient drinks alcohol near bedtime: No     Alcohol use: Infrequent use, socially 1-2 drinks  Nicotine/tobacco use:  None - quit in 2000  Recreational drug use: None      Family History:  Patient has a family member been diagnosed with a sleep disorder: No            SCALES:    EPWORTH SLEEPINESS SCALE         5/12/2023     3:39 PM    Kenova Sleepiness Scale ( JAMAAL Lundberg  1990-1997[facility] [place]/English - Final version - 21 Nov 07 - [facility] [place].)   Sitting and reading Moderate chance of dozing   Watching TV High chance of dozing   Sitting, inactive in a public place (e.g. a theatre or a meeting) Would never doze   As a passenger in a car for an hour without a break Moderate chance of dozing   Lying down to rest in the afternoon when circumstances permit Moderate chance of dozing   Sitting and talking to someone Would never doze   Sitting quietly after a lunch without alcohol Would never doze   In a car, while stopped for a few minutes in traffic Would never doze   Kenova Score (MC) 9   Kenova Score (Sleep) 9         INSOMNIA SEVERITY INDEX (IRINA)          5/12/2023     3:22 PM   Insomnia Severity Index (IRINA)   Difficulty falling asleep 0   Difficulty staying asleep 3   Problems waking up too early 0   How SATISFIED/DISSATISFIED are you with your CURRENT sleep pattern? 3   How NOTICEABLE to others do you think your sleep problem is in terms of impairing the quality of your life? 4   How WORRIED/DISTRESSED are you about your current sleep problem? 3   To what extent do you consider your sleep problem to INTERFERE with your daily functioning (e.g. daytime fatigue, mood, ability to function at work/daily chores, concentration, memory, mood, etc.) CURRENTLY? 2   IRINA Total Score 15       Guidelines for Scoring/Interpretation:  Total score categories:  0-7 = No clinically significant insomnia   8-14 = Subthreshold insomnia   15-21 = Clinical insomnia (moderate severity)  22-28 = Clinical insomnia  "(severe)  Used via courtesy of www.myhealth.va.gov with permission from Carroll Seo PhD., [facility]      STOP BANG score: 3        5/12/2023     3:41 PM   STOP BANG Questionnaire (  2008, the American Society of Anesthesiologists, Inc. [employer]   1. Snoring - Do you snore loudly (louder than talking or loud enough to be heard through closed doors)? Yes   2. Tired - Do you often feel tired, fatigued, or sleepy during daytime? Yes   3. Observed - Has anyone observed you stop breathing during your sleep? No   4. Blood pressure - Do you have or are you being treated for high blood pressure? No   5. BMI - BMI more than 35 kg/m2? No   6. Age - Age over 50 yr old? Yes   7. Neck circumference - Neck circumference greater than 40 cm? No   8. Gender - Gender male? No   STOP BANG Score (MC): 4 (High risk of GUSTAVO)         GAD7         View : No data to display.                  CAGE-AID         View : No data to display.                CAGE-AID reprinted with permission from the [place] Medical Journal, JOSE Jordan. and CHERRY Chun, \"Conjoint screening questionnaires for alcohol and drug abuse\" [place] Medical Journal 94: 135-140, 1995.      PATIENT HEALTH QUESTIONNAIRE-9 (PHQ - 9)        12/29/2022     8:12 AM   PHQ-9 ([employer])   1.  Little interest or pleasure in doing things 0   2.  Feeling down, depressed, or hopeless 0   3.  Trouble falling or staying asleep, or sleeping too much 3   4.  Feeling tired or having little energy 3   5.  Poor appetite or overeating 0   6.  Feeling bad about yourself - or that you are a failure or have let yourself or your family down 0   7.  Trouble concentrating on things, such as reading the newspaper or watching television 0   8.  Moving or speaking so slowly that other people could have noticed. Or the opposite - being so fidgety or restless that you have been moving around a lot more than usual 0   9.  Thoughts that you would be better off dead, or " of hurting yourself in some way 0   PHQ-9 Total Score 6   6.  Feeling bad about yourself 0   7.  Trouble concentrating 0   8.  Moving slowly or restless 0   9.  Suicidal or self-harm thoughts 0   1.  Little interest or pleasure in doing things Not at all   2.  Feeling down, depressed, or hopeless Not at all   3.  Trouble falling or staying asleep, or sleeping too much Nearly every day   4.  Feeling tired or having little energy Nearly every day   5.  Poor appetite or overeating Not at all   6.  Feeling bad about yourself Not at all   7.  Trouble concentrating Not at all   8.  Moving slowly or restless Not at all   9.  Suicidal or self-harm thoughts Not at all   PHQ-9 via [employer] TOTAL SCORE-----> 6 (Mild depression)   Difficulty at work, home, or with people Not difficult at all       Developed by Elmira Mane, Ashley Diop, Brian Casillas and colleagues, with an educational milton from [employer]. No permission required to reproduce, translate, display or distribute.        Allergies:    No Known Allergies    Medications:    Current Outpatient Medications   Medication Sig Dispense Refill     diclofenac (VOLTAREN) 1 % topical gel Apply 4 g topically 4 times daily 100 g 3     citalopram (CELEXA) 10 MG tablet TAKE 1 TABLET BY MOUTH  DAILY (Patient not taking: Reported on 5/12/2023) 90 tablet 3       Problem List:  Patient Active Problem List    Diagnosis Date Noted     Degeneration of cervical intervertebral disc 05/12/2023     Priority: Medium     Formatting of this note might be different from the original.  s/p injection 9/2006 for ongoing pain- medications tried: tramadol and gabapentin  MRI- 2/7/2012   Last injection- 3/2008-2/7/2012   Physical therapy last done years ago.    Current medications: Vicodin (ran out)       Depression with anxiety 01/29/2020     Priority: Medium     Spinal stenosis of cervical region 10/02/2018     Priority: Medium     Formatting of this note might be different from the  original.  TCO diagnosis C3-C4 and C4-C5 bilateral foraminal stenosis with mild to moderate central stenosis and no signs of myelomalacia       Chronic left shoulder pain 2018     Priority: Medium     Formatting of this note might be different from the original.  Xray: calcific tendinopathy of the rotator cuff, ac joint arthritis mild  Referral to Orthopedics (2018)  If not helping consider vascular medicine for TOS evaluation.       Abnormal laboratory test 2017     Priority: Medium     Formatting of this note might be different from the original.  Symptoms: joint pain  Lab: rheumatoid arthritis factor + 47.3  Joints: neck, hands, fingers, wrists everything ache all the time.   Recommendations: Rheumatology.       Hyperlipidemia with target low density lipoprotein (LDL) cholesterol less than 130 mg/dL 2008     Priority: Medium     Formatting of this note might be different from the original.  10/25/2013  Chol: 231    T    HDL:   49  LDL:  142     Recommendations:       Carpal tunnel syndrome of left wrist 2008     Priority: Medium     Formatting of this note might be different from the original.  EMG 2008 left mild median neuropathy EMG 2018 normal.   Therapies: none, no bracing.   Current medications: none.          Past Medical/Surgical History:  Past Medical History:   Diagnosis Date     Arthritis     neck, wrists     Past Surgical History:   Procedure Laterality Date     BACK SURGERY         Social History:  Social History     Socioeconomic History     Marital status:      Spouse name: Not on file     Number of children: Not on file     Years of education: Not on file     Highest education level: Not on file   Occupational History     Not on file   Tobacco Use     Smoking status: Never     Smokeless tobacco: Never   Vaping Use     Vaping status: Never Used   Substance and Sexual Activity     Alcohol use: Not Currently     Comment: 0-1 per week     Drug use: Never  "    Sexual activity: Yes     Partners: Male   Other Topics Concern     Not on file   Social History Narrative     Not on file     Social Determinants of Health     Financial Resource Strain: Not on file   Food Insecurity: Not on file   Transportation Needs: Not on file   Physical Activity: Not on file   Stress: Not on file   Social Connections: Not on file   Intimate Partner Violence: Not on file   Housing Stability: Not on file       Family History:  Family History   Problem Relation Age of Onset     Cancer Mother         breast cancer-60, lung     Cancer Father         esophageal     Breast Cancer Sister         double masectomy     No Known Problems Maternal Grandmother      Cancer Maternal Grandfather         bone     Parkinsonism Paternal Grandmother      No Known Problems Paternal Grandfather        Review of Systems:  A complete review of systems reviewed by me is negative with the exeption of what has been mentioned in the history of present illness.  In the last TWO WEEKS have you experienced any of the following symptoms?  Fevers: No  Night Sweats: Yes  Weight Gain: No  Pain at Night: Yes  Double Vision: No  Changes in Vision: No  Difficulty Breathing through Nose: No  Sore Throat in Morning: No  Dry Mouth in the Morning: Yes  Shortness of Breath Lying Flat: No  Shortness of Breath With Activity: Yes  Awakening with Shortness of Breath: No  Increased Cough: No  Heart Racing at Night: No  Swelling in Feet or Legs: No  Diarrhea at Night: No  Heartburn at Night: Yes  Urinating More than Once at Night: Yes  Losing Control of Urine at Night: No  Joint Pains at Night: Yes  Headaches in Morning: No  Weakness in Arms or Legs: Yes  Depressed Mood: No  Anxiety: No     Physical Examination:  Vitals: Ht 1.613 m (5' 3.5\")   Wt 77.1 kg (170 lb)   BMI 29.64 kg/m    BMI= Body mass index is 29.64 kg/m .           GENERAL APPEARANCE: healthy, alert, no distress and cooperative  EYES: Eyes grossly normal to " inspection  RESP: Unlabored, easy breathing with normal conversation speech  CV: color normal  NEURO: Alert and oriented x3, normal strength and tone, mentation intact and speech normal  PSYCH: mentation appears normal and affect normal/bright  Mallampati Class: Unable to examine  Tonsillar Stage: Unable to examine         Data: All pertinent previous laboratory data reviewed     Recent Labs   Lab Test 12/29/22  0943      POTASSIUM 4.9   CHLORIDE 106   CO2 24   ANIONGAP 10   GLC 94   BUN 19.8   CR 0.78   LOGAN 9.2       No results for input(s): WBC, RBC, HGB, HCT, MCV, MCH, MCHC, RDW, PLT in the last 68004 hours.    Recent Labs   Lab Test 12/29/22  0943   PROTTOTAL 7.1   ALBUMIN 4.3   BILITOTAL 0.2   ALKPHOS 86   AST 27   ALT 28       No results found for: TSH    No results found for: UAMP, UBARB, BENZODIAZEUR, UCANN, UCOC, OPIT, UPCP    No results found for: IRONSAT, [id number], ROBER    No results found for: PH, PHARTERIAL, PO2, [id number], SAT, PCO2, HCO3, BASEEXCESS, GONZÁLEZ, BEB    @LABRCNTIPR(phv:4,pco2v:4,po2v:4,hco3v:4,russell:4,o2per:4)@    Echocardiology: No results found for this or any previous visit (from the past 4320 hour(s)).    Chest x-ray: No results found for this or any previous visit from the past 365 days.      Chest CT: No results found for this or any previous visit from the past 365 days.      PFT: Most Recent [facility]ze Pulmonary Function Testing    No results found for: 20001  No results found for: 20002  No results found for: 20003  No results found for: 20015  No results found for: 20016  No results found for: 20027  No results found for: 20028  No results found for: 20029  No results found for: 20079  No results found for: 20080  No results found for: 20081  No results found for: 20335  No results found for: 20105  No results found for: 20053  No results found for: 20054  No results found for: 20055      KAHLIL Edward CNP 5/12/2023   Sleep Medicine      This note was written with the  assistance of the Dragon voice-dictation technology software. The final document, although reviewed, may contain errors. For corrections, please contact the office.

## 2023-11-29 ENCOUNTER — PATIENT OUTREACH (OUTPATIENT)
Dept: CARE COORDINATION | Facility: CLINIC | Age: 63
End: 2023-11-29
Payer: COMMERCIAL

## 2023-12-13 ENCOUNTER — PATIENT OUTREACH (OUTPATIENT)
Dept: CARE COORDINATION | Facility: CLINIC | Age: 63
End: 2023-12-13
Payer: COMMERCIAL

## 2024-01-03 ENCOUNTER — ANCILLARY PROCEDURE (OUTPATIENT)
Dept: GENERAL RADIOLOGY | Facility: CLINIC | Age: 64
End: 2024-01-03
Attending: FAMILY MEDICINE
Payer: COMMERCIAL

## 2024-01-03 ENCOUNTER — OFFICE VISIT (OUTPATIENT)
Dept: URGENT CARE | Facility: URGENT CARE | Age: 64
End: 2024-01-03
Payer: COMMERCIAL

## 2024-01-03 VITALS
TEMPERATURE: 98.4 F | BODY MASS INDEX: 29.64 KG/M2 | SYSTOLIC BLOOD PRESSURE: 137 MMHG | DIASTOLIC BLOOD PRESSURE: 85 MMHG | HEART RATE: 75 BPM | WEIGHT: 170 LBS | OXYGEN SATURATION: 97 %

## 2024-01-03 DIAGNOSIS — J98.01 BRONCHOSPASM: ICD-10-CM

## 2024-01-03 DIAGNOSIS — J18.9 ATYPICAL PNEUMONIA: Primary | ICD-10-CM

## 2024-01-03 DIAGNOSIS — R05.1 ACUTE COUGH: ICD-10-CM

## 2024-01-03 PROCEDURE — 71046 X-RAY EXAM CHEST 2 VIEWS: CPT | Mod: TC | Performed by: RADIOLOGY

## 2024-01-03 PROCEDURE — 99214 OFFICE O/P EST MOD 30 MIN: CPT | Performed by: FAMILY MEDICINE

## 2024-01-03 RX ORDER — BENZONATATE 200 MG/1
200 CAPSULE ORAL 3 TIMES DAILY PRN
Qty: 30 CAPSULE | Refills: 0 | Status: SHIPPED | OUTPATIENT
Start: 2024-01-03 | End: 2024-02-21

## 2024-01-03 RX ORDER — ALBUTEROL SULFATE 90 UG/1
2 AEROSOL, METERED RESPIRATORY (INHALATION) EVERY 6 HOURS PRN
Qty: 18 G | Refills: 0 | Status: SHIPPED | OUTPATIENT
Start: 2024-01-03 | End: 2024-02-21

## 2024-01-03 RX ORDER — AZITHROMYCIN 250 MG/1
TABLET, FILM COATED ORAL
Qty: 6 TABLET | Refills: 0 | Status: SHIPPED | OUTPATIENT
Start: 2024-01-03 | End: 2024-01-08

## 2024-01-03 RX ORDER — PREDNISONE 20 MG/1
40 TABLET ORAL DAILY
Qty: 10 TABLET | Refills: 0 | Status: SHIPPED | OUTPATIENT
Start: 2024-01-03 | End: 2024-01-08

## 2024-01-03 RX ORDER — CODEINE PHOSPHATE AND GUAIFENESIN 10; 100 MG/5ML; MG/5ML
2 SOLUTION ORAL EVERY 6 HOURS PRN
Qty: 118 ML | Refills: 0 | Status: SHIPPED | OUTPATIENT
Start: 2024-01-03 | End: 2024-02-21

## 2024-01-03 NOTE — PROGRESS NOTES
SUBJECTIVE:   Korin Islas is a 63 year old female presenting with a chief complaint of cough, SOB, wheezing, productive cough.  Onset of symptoms was 10 day(s) ago.  Course of illness is worsening.    Severity moderate  Current and Associated symptoms: cough, SOB  Treatment measures tried include Tylenol/Ibuprofen, OTC Cough med, Fluids, and Rest.  Predisposing factors include ill contact: Family member .    Felt like typical URI, grandson had cold symptoms and ear infection.  Had low grade fever, lost voice.  Cough has persisted and worsening.  Endorsed wheezing and more SOB with exertion.    No history of asthma    Home rapid COVID test negative X 2    Past Medical History:   Diagnosis Date    Arthritis     neck, wrists     Current Outpatient Medications   Medication Sig Dispense Refill    citalopram (CELEXA) 10 MG tablet TAKE 1 TABLET BY MOUTH  DAILY (Patient not taking: Reported on 5/12/2023) 90 tablet 3    diclofenac (VOLTAREN) 1 % topical gel Apply 4 g topically 4 times daily (Patient not taking: Reported on 1/3/2024) 100 g 3     Social History     Tobacco Use    Smoking status: Never    Smokeless tobacco: Never   Substance Use Topics    Alcohol use: Not Currently     Comment: 0-1 per week       ROS:  Review of systems negative except as stated above.    OBJECTIVE:  /85   Pulse 75   Temp 98.4  F (36.9  C) (Oral)   Wt 77.1 kg (170 lb)   SpO2 97%   BMI 29.64 kg/m    GENERAL APPEARANCE: healthy, alert and no distress  EYES: EOMI,  PERRL, conjunctiva clear  RESP: lungs clear to auscultation - no rales, rhonchi or wheezes  CV: regular rates and rhythm, normal S1 S2, no murmur noted  PSYCH: mentation appears normal and affect normal/bright    CXR - faint increase opacity in right lower lobe, no pleural effusion, no pneumothorax      ASSESSMENT/PLAN:  (J18.9) Atypical pneumonia  (primary encounter diagnosis)  Plan: albuterol (PROAIR HFA/PROVENTIL HFA/VENTOLIN         HFA) 108 (90 Base) MCG/ACT  inhaler, benzonatate        (TESSALON) 200 MG capsule, azithromycin         (ZITHROMAX) 250 MG tablet            (R05.1) Acute cough  Plan: XR Chest 2 Views, albuterol (PROAIR         HFA/PROVENTIL HFA/VENTOLIN HFA) 108 (90 Base)         MCG/ACT inhaler, benzonatate (TESSALON) 200 MG         capsule, guaiFENesin-codeine (ROBITUSSIN AC)         100-10 MG/5ML solution            (J98.01) Bronchospasm  Plan: predniSONE (DELTASONE) 20 MG tablet, albuterol         (PROAIR HFA/PROVENTIL HFA/VENTOLIN HFA) 108 (90        Base) MCG/ACT inhaler, benzonatate (TESSALON)         200 MG capsule            Reassurance given, reviewed symptomatic treatment with tylenol, ibuprofen, plenty of fluids and rest.  RX Zpak given for lower respiratory tract infection/atypical pneumonia presentation due to worsening symptoms.  RX tessalon perles and RX bc AC given to help with cough, RX prednisone burst and RX albuterol inhaler given for bronchospasm symptoms.    Follow up with primary provider if no improvement of symptoms in 1-2 week    Ramón Noonan MD  January 3, 2024 4:37 PM

## 2024-01-03 NOTE — PATIENT INSTRUCTIONS
Okay to take ibuprofen 200 mg - 4 tablets (800 mg) every 8 hours as needed.  Okay to take tylenol 500 mg - 2 tablets (1000 mg) every 6-8 hours as needed, do not exceed 3000 mg in 24 hours.  Take full course of antibiotic - Zpak for early/atypical pneumonia  Use albuterol inhaler to help with cough, wheezing or shortness of breath  Take full course of prednisone burst for bronchospasm symptoms  Use tessalon perles to help with cough during the day, use robitussin with codeine at bedtime due to drowsiness

## 2024-02-20 SDOH — HEALTH STABILITY: PHYSICAL HEALTH: ON AVERAGE, HOW MANY DAYS PER WEEK DO YOU ENGAGE IN MODERATE TO STRENUOUS EXERCISE (LIKE A BRISK WALK)?: 0 DAYS

## 2024-02-20 SDOH — HEALTH STABILITY: PHYSICAL HEALTH: ON AVERAGE, HOW MANY MINUTES DO YOU ENGAGE IN EXERCISE AT THIS LEVEL?: 0 MIN

## 2024-02-20 ASSESSMENT — PATIENT HEALTH QUESTIONNAIRE - PHQ9
10. IF YOU CHECKED OFF ANY PROBLEMS, HOW DIFFICULT HAVE THESE PROBLEMS MADE IT FOR YOU TO DO YOUR WORK, TAKE CARE OF THINGS AT HOME, OR GET ALONG WITH OTHER PEOPLE: NOT DIFFICULT AT ALL
SUM OF ALL RESPONSES TO PHQ QUESTIONS 1-9: 5
SUM OF ALL RESPONSES TO PHQ QUESTIONS 1-9: 5

## 2024-02-20 ASSESSMENT — SOCIAL DETERMINANTS OF HEALTH (SDOH): HOW OFTEN DO YOU GET TOGETHER WITH FRIENDS OR RELATIVES?: ONCE A WEEK

## 2024-02-20 NOTE — COMMUNITY RESOURCES LIST (ENGLISH)
02/20/2024    [employer]  N/A  For questions about this resource list or additional care needs, please contact your primary care clinic or care manager.  Phone: [phone]   Email: N/A   Address: [address] [place] [id number]   Hours: N/A        Exercise and Recreation       Gym or workout facility  1  [employer] [facility] Distance: 0.9 miles      In-Person   [address] [place] [address]  Language: English  Hours: Mon - Sun Open 24 Hours  Fees: Insurance, Self Pay, Sliding Fee   Phone: [phone]) [phone] Email: [email or web address] Website: [email or web address]     2  [facility] [facility] Distance: 1.66 miles      In-Person   [address] [place] [address]  Language: English, Hmong, Czech, Lao  Hours: Mon - Fri 3:00 PM - 9:00 PM , Sat 9:00 AM - 4:00 PM  Fees: Free, Self Pay   Phone: [phone]) [phone] Email: [email or web address] Website: [email or web address]/parks__destinations/recreation_centers/[facility]_[facility]/          Important Numbers & Websites       Emergency Services   911  City Services   311  Poison Control   (382) 777-8551  Suicide Prevention Lifeline   (781) 891-3316 (TALK)  Child Abuse Hotline   (589) 949-1995 (4-A-Child)  Sexual Assault Hotline   (934) 295-4575 (HOPE)  National Runaway Safeline   (444) 903-6863 (RUNAWAY)  All-Options Talkline   (129) 542-2200  Substance Abuse Referral   (836) 169-7552 (HELP)

## 2024-02-21 ENCOUNTER — OFFICE VISIT (OUTPATIENT)
Dept: PEDIATRICS | Facility: CLINIC | Age: 64
End: 2024-02-21
Payer: COMMERCIAL

## 2024-02-21 VITALS
OXYGEN SATURATION: 96 % | SYSTOLIC BLOOD PRESSURE: 138 MMHG | HEIGHT: 63 IN | WEIGHT: 171 LBS | BODY MASS INDEX: 30.3 KG/M2 | RESPIRATION RATE: 16 BRPM | DIASTOLIC BLOOD PRESSURE: 80 MMHG | HEART RATE: 78 BPM | TEMPERATURE: 98.1 F

## 2024-02-21 DIAGNOSIS — E78.5 HYPERLIPIDEMIA WITH TARGET LOW DENSITY LIPOPROTEIN (LDL) CHOLESTEROL LESS THAN 130 MG/DL: ICD-10-CM

## 2024-02-21 DIAGNOSIS — M05.742 RHEUMATOID ARTHRITIS INVOLVING BOTH HANDS WITH POSITIVE RHEUMATOID FACTOR (H): ICD-10-CM

## 2024-02-21 DIAGNOSIS — Z00.00 ENCOUNTER FOR MEDICARE ANNUAL WELLNESS EXAM: Primary | ICD-10-CM

## 2024-02-21 DIAGNOSIS — Z11.59 NEED FOR HEPATITIS C SCREENING TEST: ICD-10-CM

## 2024-02-21 DIAGNOSIS — F41.1 GAD (GENERALIZED ANXIETY DISORDER): ICD-10-CM

## 2024-02-21 DIAGNOSIS — M05.741 RHEUMATOID ARTHRITIS INVOLVING BOTH HANDS WITH POSITIVE RHEUMATOID FACTOR (H): ICD-10-CM

## 2024-02-21 DIAGNOSIS — Z78.0 POST-MENOPAUSAL: ICD-10-CM

## 2024-02-21 DIAGNOSIS — R79.9 ABNORMAL FINDING OF BLOOD CHEMISTRY, UNSPECIFIED: ICD-10-CM

## 2024-02-21 DIAGNOSIS — Z12.31 ENCOUNTER FOR SCREENING MAMMOGRAM FOR BREAST CANCER: ICD-10-CM

## 2024-02-21 DIAGNOSIS — Z29.11 NEED FOR VACCINATION AGAINST RESPIRATORY SYNCYTIAL VIRUS: ICD-10-CM

## 2024-02-21 DIAGNOSIS — E55.9 VITAMIN D DEFICIENCY: ICD-10-CM

## 2024-02-21 DIAGNOSIS — Z11.4 SCREENING FOR HIV (HUMAN IMMUNODEFICIENCY VIRUS): ICD-10-CM

## 2024-02-21 PROBLEM — N95.1 SYMPTOMATIC MENOPAUSAL OR FEMALE CLIMACTERIC STATES: Status: ACTIVE | Noted: 2024-02-21

## 2024-02-21 PROBLEM — G89.29 CHRONIC LEFT SHOULDER PAIN: Status: RESOLVED | Noted: 2018-09-23 | Resolved: 2024-02-21

## 2024-02-21 PROBLEM — M25.512 CHRONIC LEFT SHOULDER PAIN: Status: RESOLVED | Noted: 2018-09-23 | Resolved: 2024-02-21

## 2024-02-21 PROBLEM — N95.1 SYMPTOMATIC MENOPAUSAL OR FEMALE CLIMACTERIC STATES: Status: RESOLVED | Noted: 2024-02-21 | Resolved: 2024-02-21

## 2024-02-21 PROBLEM — F41.8 DEPRESSION WITH ANXIETY: Status: RESOLVED | Noted: 2020-01-29 | Resolved: 2024-02-21

## 2024-02-21 PROBLEM — F51.01 PRIMARY INSOMNIA: Status: ACTIVE | Noted: 2024-02-21

## 2024-02-21 PROBLEM — R89.9 ABNORMAL LABORATORY TEST: Status: RESOLVED | Noted: 2017-09-14 | Resolved: 2024-02-21

## 2024-02-21 LAB
ALBUMIN SERPL BCG-MCNC: 4.5 G/DL (ref 3.5–5.2)
ALBUMIN UR-MCNC: NEGATIVE MG/DL
ALP SERPL-CCNC: 88 U/L (ref 40–150)
ALT SERPL W P-5'-P-CCNC: 28 U/L (ref 0–50)
ANION GAP SERPL CALCULATED.3IONS-SCNC: 10 MMOL/L (ref 7–15)
APPEARANCE UR: CLEAR
AST SERPL W P-5'-P-CCNC: 24 U/L (ref 0–45)
BILIRUB SERPL-MCNC: 0.2 MG/DL
BILIRUB UR QL STRIP: NEGATIVE
BUN SERPL-MCNC: 20.3 MG/DL (ref 8–23)
CALCIUM SERPL-MCNC: 9.3 MG/DL (ref 8.8–10.2)
CHLORIDE SERPL-SCNC: 104 MMOL/L (ref 98–107)
CHOLEST SERPL-MCNC: 251 MG/DL
COLOR UR AUTO: YELLOW
CREAT SERPL-MCNC: 0.74 MG/DL (ref 0.51–0.95)
CRP SERPL-MCNC: 5.08 MG/L
DEPRECATED HCO3 PLAS-SCNC: 26 MMOL/L (ref 22–29)
EGFRCR SERPLBLD CKD-EPI 2021: 89 ML/MIN/1.73M2
ERYTHROCYTE [DISTWIDTH] IN BLOOD BY AUTOMATED COUNT: 12.7 % (ref 10–15)
ERYTHROCYTE [SEDIMENTATION RATE] IN BLOOD BY WESTERGREN METHOD: 9 MM/HR (ref 0–30)
FASTING STATUS PATIENT QL REPORTED: YES
GLUCOSE SERPL-MCNC: 88 MG/DL (ref 70–99)
GLUCOSE UR STRIP-MCNC: NEGATIVE MG/DL
HBA1C MFR BLD: 6 % (ref 0–5.6)
HCT VFR BLD AUTO: 42.3 % (ref 35–47)
HCV AB SERPL QL IA: NONREACTIVE
HDLC SERPL-MCNC: 60 MG/DL
HGB BLD-MCNC: 13.6 G/DL (ref 11.7–15.7)
HGB UR QL STRIP: NEGATIVE
HIV 1+2 AB+HIV1 P24 AG SERPL QL IA: NONREACTIVE
KETONES UR STRIP-MCNC: NEGATIVE MG/DL
LDLC SERPL CALC-MCNC: 157 MG/DL
LEUKOCYTE ESTERASE UR QL STRIP: NEGATIVE
MCH RBC QN AUTO: 28.7 PG (ref 26.5–33)
MCHC RBC AUTO-ENTMCNC: 32.2 G/DL (ref 31.5–36.5)
MCV RBC AUTO: 89 FL (ref 78–100)
NITRATE UR QL: NEGATIVE
NONHDLC SERPL-MCNC: 191 MG/DL
PH UR STRIP: 5.5 [PH] (ref 5–7)
PLATELET # BLD AUTO: 217 10E3/UL (ref 150–450)
POTASSIUM SERPL-SCNC: 4.6 MMOL/L (ref 3.4–5.3)
PROT SERPL-MCNC: 7.2 G/DL (ref 6.4–8.3)
RBC # BLD AUTO: 4.74 10E6/UL (ref 3.8–5.2)
RHEUMATOID FACT SERPL-ACNC: 16 IU/ML
SODIUM SERPL-SCNC: 140 MMOL/L (ref 135–145)
SP GR UR STRIP: 1.02 (ref 1–1.03)
TRIGL SERPL-MCNC: 170 MG/DL
UROBILINOGEN UR STRIP-ACNC: 0.2 E.U./DL
VIT D+METAB SERPL-MCNC: 31 NG/ML (ref 20–50)
WBC # BLD AUTO: 6.1 10E3/UL (ref 4–11)

## 2024-02-21 PROCEDURE — 86431 RHEUMATOID FACTOR QUANT: CPT | Performed by: STUDENT IN AN ORGANIZED HEALTH CARE EDUCATION/TRAINING PROGRAM

## 2024-02-21 PROCEDURE — 90677 PCV20 VACCINE IM: CPT | Performed by: STUDENT IN AN ORGANIZED HEALTH CARE EDUCATION/TRAINING PROGRAM

## 2024-02-21 PROCEDURE — 85027 COMPLETE CBC AUTOMATED: CPT | Performed by: STUDENT IN AN ORGANIZED HEALTH CARE EDUCATION/TRAINING PROGRAM

## 2024-02-21 PROCEDURE — 36415 COLL VENOUS BLD VENIPUNCTURE: CPT | Performed by: STUDENT IN AN ORGANIZED HEALTH CARE EDUCATION/TRAINING PROGRAM

## 2024-02-21 PROCEDURE — 87389 HIV-1 AG W/HIV-1&-2 AB AG IA: CPT | Performed by: STUDENT IN AN ORGANIZED HEALTH CARE EDUCATION/TRAINING PROGRAM

## 2024-02-21 PROCEDURE — 81003 URINALYSIS AUTO W/O SCOPE: CPT | Performed by: STUDENT IN AN ORGANIZED HEALTH CARE EDUCATION/TRAINING PROGRAM

## 2024-02-21 PROCEDURE — 82306 VITAMIN D 25 HYDROXY: CPT | Performed by: STUDENT IN AN ORGANIZED HEALTH CARE EDUCATION/TRAINING PROGRAM

## 2024-02-21 PROCEDURE — 86803 HEPATITIS C AB TEST: CPT | Performed by: STUDENT IN AN ORGANIZED HEALTH CARE EDUCATION/TRAINING PROGRAM

## 2024-02-21 PROCEDURE — 99214 OFFICE O/P EST MOD 30 MIN: CPT | Mod: 25 | Performed by: STUDENT IN AN ORGANIZED HEALTH CARE EDUCATION/TRAINING PROGRAM

## 2024-02-21 PROCEDURE — G0402 INITIAL PREVENTIVE EXAM: HCPCS | Mod: GC | Performed by: STUDENT IN AN ORGANIZED HEALTH CARE EDUCATION/TRAINING PROGRAM

## 2024-02-21 PROCEDURE — 80053 COMPREHEN METABOLIC PANEL: CPT | Performed by: STUDENT IN AN ORGANIZED HEALTH CARE EDUCATION/TRAINING PROGRAM

## 2024-02-21 PROCEDURE — 86200 CCP ANTIBODY: CPT | Performed by: STUDENT IN AN ORGANIZED HEALTH CARE EDUCATION/TRAINING PROGRAM

## 2024-02-21 PROCEDURE — 86140 C-REACTIVE PROTEIN: CPT | Performed by: STUDENT IN AN ORGANIZED HEALTH CARE EDUCATION/TRAINING PROGRAM

## 2024-02-21 PROCEDURE — 80061 LIPID PANEL: CPT | Performed by: STUDENT IN AN ORGANIZED HEALTH CARE EDUCATION/TRAINING PROGRAM

## 2024-02-21 PROCEDURE — 85652 RBC SED RATE AUTOMATED: CPT | Performed by: STUDENT IN AN ORGANIZED HEALTH CARE EDUCATION/TRAINING PROGRAM

## 2024-02-21 PROCEDURE — 83036 HEMOGLOBIN GLYCOSYLATED A1C: CPT | Performed by: STUDENT IN AN ORGANIZED HEALTH CARE EDUCATION/TRAINING PROGRAM

## 2024-02-21 PROCEDURE — G0009 ADMIN PNEUMOCOCCAL VACCINE: HCPCS | Performed by: STUDENT IN AN ORGANIZED HEALTH CARE EDUCATION/TRAINING PROGRAM

## 2024-02-21 RX ORDER — CITALOPRAM HYDROBROMIDE 10 MG/1
10 TABLET ORAL DAILY
Qty: 90 TABLET | Refills: 3 | Status: SHIPPED | OUTPATIENT
Start: 2024-02-21

## 2024-02-21 RX ORDER — A/SINGAPORE/GP1908/2015 IVR-180 (AN A/MICHIGAN/45/2015 (H1N1)PDM09-LIKE VIRUS, A/HONG KONG/4801/2014, NYMC X-263B (H3N2) (AN A/HONG KONG/4801/2014-LIKE VIRUS), AND B/BRISBANE/60/2008, WILD TYPE (A B/BRISBANE/60/2008-LIKE VIRUS) 15; 15; 15 UG/.5ML; UG/.5ML; UG/.5ML
0.5 INJECTION, SUSPENSION INTRAMUSCULAR ONCE
COMMUNITY
Start: 2023-10-11 | End: 2024-02-21

## 2024-02-21 RX ORDER — COVID-19 VACCINE, MRNA 0.23 MG/2.25ML
INJECTION, SUSPENSION INTRAMUSCULAR
COMMUNITY
Start: 2023-10-11

## 2024-02-21 RX ORDER — RESPIRATORY SYNCYTIAL VIRUS VACCINE 120MCG/0.5
0.5 KIT INTRAMUSCULAR ONCE
Qty: 1 EACH | Refills: 0 | Status: CANCELLED | OUTPATIENT
Start: 2024-02-21 | End: 2024-02-21

## 2024-02-21 ASSESSMENT — PAIN SCALES - GENERAL: PAINLEVEL: SEVERE PAIN (6)

## 2024-02-21 NOTE — PROGRESS NOTES
Preventive Care Visit  [facility] KASEY Turner MD, Internal Medicine - Pediatrics  Feb 21, 2024    Assessment & Plan     Encounter for Medicare annual wellness exam  Overall doing quite well, here to establish care today.  Will check some routine labs, will get the pneumonia vaccine, otherwise up-to-date.  Up-to-date with all cancer screenings at this time, no concerns on exam.  - COMIRNATY 30 MCG/0.3ML injection  - PRIMARY CARE FOLLOW-UP SCHEDULING  - Comprehensive metabolic panel (BMP + Alb, Alk Phos, ALT, AST, Total. Bili, TP)  - Comprehensive metabolic panel (BMP + Alb, Alk Phos, ALT, AST, Total. Bili, TP)    SARAH (generalized anxiety disorder)  Patient with a history of SARAH, had been quite stable on low-dose Celexa for some time.  Weaned herself off several months ago and has since felt like going back on would be beneficial.  Will resume at her dose of 10 mg.  - citalopram (CELEXA) 10 MG tablet  Dispense: 90 tablet; Refill: 3    Rheumatoid arthritis involving both hands with positive rheumatoid factor (H)  Patient carries a diagnosis of rheumatoid arthritis given multiple joint involvement as well as elevated RF.  She has never been seen by rheumatology and has not had a robust workup for her rheumatoid arthritis as far as I can see.  Will repeat multiple labs today, we will also refer her to rheumatology given her possible diagnoses.  - Adult Rheumatology  Referral  - Cyclic Citrullinated Peptide Antibody IgG  - Rheumatoid factor  - ESR: Erythrocyte sedimentation rate  - CRP, inflammation  - CBC with platelets  - UA Macroscopic with reflex to Microscopic and Culture - Lab Collect  - Cyclic Citrullinated Peptide Antibody IgG  - Rheumatoid factor  - ESR: Erythrocyte sedimentation rate  - CRP, inflammation  - CBC with platelets  - UA Macroscopic with reflex to Microscopic and Culture - Lab Collect    Vitamin D deficiency  - Vitamin D Deficiency  - Vitamin D  "Deficiency    Encounter for screening mammogram for breast cancer  - *MA Screening Digital Bilateral    Post-menopausal  - DEXA HIP/PELVIS/SPINE - Future    Abnormal finding of blood chemistry, unspecified  - Hemoglobin A1c  - Hemoglobin A1c    Hyperlipidemia with target low density lipoprotein (LDL) cholesterol less than 130 mg/dL  - Lipid panel reflex to direct LDL Fasting  - Lipid panel reflex to direct LDL Fasting    Screening for HIV (human immunodeficiency virus)  - HIV Antigen Antibody Combo  - HIV Antigen Antibody Combo    Need for hepatitis C screening test  - Hepatitis C Screen Reflex to HCV RNA Quant and Genotype  - Hepatitis C Screen Reflex to HCV RNA Quant and Genotype    Need for vaccination against respiratory syncytial virus  Discussed getting vaccine in the pharmacy.    BMI  Estimated body mass index is 30.3 kg/m  as calculated from the following:    Height as of this encounter: 1.6 m (5' 2.99\").    Weight as of this encounter: 77.6 kg (171 lb).     Counseling  Appropriate preventive services were discussed with this patient, including applicable screening as appropriate for fall prevention, nutrition, physical activity, Tobacco-use cessation, weight loss and cognition.  Checklist reviewing preventive services available has been given to the patient.  Reviewed patient's diet, addressing concerns and/or questions.   The patient's PHQ-9 score is consistent with mild depression. She was provided with information regarding depression.     See Patient Instructions    Dulce Langley is a 63 year old, presenting for the following:  Establish Care and Physical    Would like to get back on citalopram for anxiety, took herself off of it a few months ago. Weaned herself off of it slowly.     Following with John for back pain - worried about disc rupture. Has sciatica on the left. Also has weakness with the pain. No bowel or bladder symptoms. Seeing them in March.     Has RA, has not followed with " rheumatology for this. Mostly wrists and elbows. Reported RF of 47.3.         2/21/2024     8:58 AM   Additional Questions   Roomed by mf   Accompanied by self       Health Care Directive  Patient does not have a Health Care Directive or Living Will: Discussed advance care planning with patient; information given to patient to review.    HPI        12/29/2022     8:12 AM 2/20/2024     9:57 AM   PHQ   PHQ-9 Total Score 6 5   Q9: Thoughts of better off dead/self-harm past 2 weeks Not at all Not at all            2/20/2024   General Health   How would you rate your overall physical health? (!) FAIR   Feel stress (tense, anxious, or unable to sleep) Rather much   (!) STRESS CONCERN      2/20/2024   Nutrition   Diet: Regular (no restrictions)         2/20/2024   Exercise   Days per week of moderate/strenous exercise 0 days   Average minutes spent exercising at this level 0 min   (!) EXERCISE CONCERN      2/20/2024   Social Factors   Frequency of gathering with friends or relatives Once a week   Worry food won't last until get money to buy more No   Food not last or not have enough money for food? No   Do you have housing?  Yes   Are you worried about losing your housing? No   Lack of transportation? No   Unable to get utilities (heat,electricity)? No         2/20/2024   Fall Risk   Fallen 2 or more times in the past year? No    No   Trouble with walking or balance? Yes    Yes         2/20/2024   Activities of Daily Living- Home Safety   Needs help with the following daily activites None of the above   Safety concerns in the home None of the above         2/20/2024   Dental   Dentist two times every year? Yes         2/20/2024   Hearing Screening   Hearing concerns? None of the above         2/20/2024   Driving Risk Screening   Patient/family members have concerns about driving No         2/20/2024   General Alertness/Fatigue Screening   Have you been more tired than usual lately? No         2/20/2024   Urinary  Incontinence Screening   Bothered by leaking urine in past 6 months No         2024   TB Screening   Were you born outside of [place]?  No       Today's PHQ-9 Score:       2024     9:57 AM   PHQ-9 SCORE   PHQ-9 Total Score MyChart 5 (Mild depression)   PHQ-9 Total Score 5         2024   Substance Use   Alcohol more than 3/day or more than 7/wk No   Do you have a current opioid prescription? No   How severe/bad is pain from 1 to 10? 7/10   Do you use any other substances recreationally? No     Social History     Tobacco Use    Smoking status: Former     Packs/day: 1.00     Years: 25.00     Additional pack years: 0.00     Total pack years: 25.00     Types: Cigarettes     Quit date:      Years since quittin.1    Smokeless tobacco: Never   Vaping Use    Vaping Use: Never used   Substance Use Topics    Alcohol use: Not Currently     Comment: 0-1 per week    Drug use: Never           2022   LAST FHS-7 RESULTS   1st degree relative breast or ovarian cancer Yes   Any relative bilateral breast cancer No   Any male have breast cancer No   Any woman have breast and ovarian cancer Yes   Any woman with breast cancer before 50yrs Yes   2 or more relatives with breast and/or ovarian cancer No   2 or more relatives with breast and/or bowel cancer No         2024   One time HIV Screening   Previous HIV test? No     History of abnormal Pap smear: NO - age 63 - see link Cervical Cytology Screening Guidelines        Latest Ref Rng & Units 2022     8:53 AM 2017     9:25 AM   PAP / HPV   PAP  Negative for Intraepithelial Lesion or Malignancy (NILM)     HPV 16 DNA Negative Negative     HPV 18 DNA Negative Negative     Other HR HPV Negative Negative     PAP-ABSTRACT   See Scanned Document           This result is from an external source.     The 10-year ASCVD risk score (Marcos HOLCOMB, et al., 2019) is: 6.8%    Values used to calculate the score:      Age: 63 years      Sex: Female      Is Non-  ": No      Diabetic: No      Tobacco smoker: No      Systolic Blood Pressure: 138 mmHg      Is BP treated: No      HDL Cholesterol: 56 mg/dL      Total Cholesterol: 227 mg/dL    Reviewed and updated as needed this visit by Provider   Tobacco   Meds    Surg Hx  Fam Hx            Current providers sharing in care for this patient include:  Patient Care Team:  Christine Turner MD as PCP - General (Internal Medicine - Pediatrics)  Kavitha Singleton APRN CNP as Assigned PCP  Jean Marie Caicedo APRN CNP as Assigned Sleep Provider    The following health maintenance items are reviewed in Epic and correct as of today:  Health Maintenance   Topic Date Due    DEXA  Never done    DEPRESSION ACTION PLAN  Never done    HIV SCREENING  Never done    HEPATITIS C SCREENING  Never done    RSV VACCINE (Pregnancy & 60+) (1 - 1-dose 60+ series) Never done    ANNUAL REVIEW OF HM ORDERS  12/29/2023    PHQ-9  08/21/2024    MAMMO SCREENING  01/04/2025    MEDICARE ANNUAL WELLNESS VISIT  02/21/2025    FALL RISK ASSESSMENT  02/21/2025    GLUCOSE  12/29/2025    LIPID  12/29/2027    ADVANCE CARE PLANNING  12/29/2027    COLORECTAL CANCER SCREENING  01/01/2029    DTAP/TDAP/TD IMMUNIZATION (3 - Td or Tdap) 02/04/2029    INFLUENZA VACCINE  Completed    Pneumococcal Vaccine: 65+ Years  Completed    ZOSTER IMMUNIZATION  Completed    COVID-19 Vaccine  Completed    IPV IMMUNIZATION  Aged Out    HPV IMMUNIZATION  Aged Out    MENINGITIS IMMUNIZATION  Aged Out    RSV MONOCLONAL ANTIBODY  Aged Out    PAP  Discontinued     Review of Systems  Constitutional, HEENT, cardiovascular, pulmonary, gi and gu systems are negative, except as otherwise noted.     Objective    Exam  /80   Pulse 78   Temp 98.1  F (36.7  C)   Resp 16   Ht 1.6 m (5' 2.99\")   Wt 77.6 kg (171 lb)   SpO2 96%   BMI 30.30 kg/m     Estimated body mass index is 30.3 kg/m  as calculated from the following:    Height as of this encounter: 1.6 m (5' 2.99\").    " Weight as of this encounter: 77.6 kg (171 lb).    Physical Exam  GENERAL: alert and no distress  EYES: Eyes grossly normal to inspection, PERRL and conjunctivae and sclerae normal  HENT: ear canals and TM's normal, nose and mouth without ulcers or lesions  NECK: no adenopathy, no asymmetry, masses, or scars  RESP: lungs clear to auscultation - no rales, rhonchi or wheezes  CV: regular rate and rhythm, normal S1 S2, no S3 or S4, no murmur, click or rub, no peripheral edema  ABDOMEN: soft, nontender, no hepatosplenomegaly, no masses and bowel sounds normal  MS: no gross musculoskeletal defects noted, no edema  SKIN: no suspicious lesions or rashes  NEURO: Normal strength and tone, mentation intact and speech normal  PSYCH: mentation appears normal, affect normal/bright         2/21/2024   Mini Cog   Clock Draw Score 2 Normal   3 Item Recall 1 object recalled   Mini Cog Total Score 3     Vision Screen  Vision Screen Results: Pass  Signed Electronically by: Christine Turner MD    Answers submitted by the patient for this visit:  Patient Health Questionnaire (Submitted on 2/20/2024)  If you checked off any problems, how difficult have these problems made it for you to do your work, take care of things at home, or get along with other people?: Not difficult at all  PHQ9 TOTAL SCORE: 5  Physician Attestation   I, Konstantin Johnson MD, saw this patient and agree with the findings and plan of care as documented in the note.      Items personally reviewed/procedural attestation: vitals and labs.    Konstantin Johnson MD

## 2024-02-21 NOTE — PATIENT INSTRUCTIONS
- Talk to the pharmacy about the RSV vaccine   Preventive Care Advice   This is general advice given by our system to help you stay healthy. However, your care team may have specific advice just for you. Please talk to your care team about your preventive care needs.  Nutrition  Eat 5 or more servings of fruits and vegetables each day.  Try wheat bread, brown rice and whole grain pasta (instead of white bread, rice, and pasta).  Get enough calcium and vitamin D. Check the label on foods and aim for 100% of the RDA (recommended daily allowance).  Lifestyle  Exercise at least 150 minutes each week  (30 minutes a day, 5 days a week).  Do muscle strengthening activities 2 days a week. These help control your weight and prevent disease.  No smoking.  Wear sunscreen to prevent skin cancer.  Have a dental exam and cleaning every 6 months.  Yearly exams  See your health care team every year to talk about:  Any changes in your health.  Any medicines your care team has prescribed.  Preventive care, family planning, and ways to prevent chronic diseases.  Shots (vaccines)   HPV shots (up to age 26), if you've never had them before.  Hepatitis B shots (up to age 59), if you've never had them before.  COVID-19 shot: Get this shot when it's due.  Flu shot: Get a flu shot every year.  Tetanus shot: Get a tetanus shot every 10 years.  Pneumococcal, hepatitis A, and RSV shots: Ask your care team if you need these based on your risk.  Shingles shot (for age 50 and up)  General health tests  Diabetes screening:  Starting at age 35, Get screened for diabetes at least every 3 years.  If you are younger than age 35, ask your care team if you should be screened for diabetes.  Cholesterol test: At age 39, start having a cholesterol test every 5 years, or more often if advised.  Bone density scan (DEXA): At age 50, ask your care team if you should have this scan for osteoporosis (brittle bones).  Hepatitis C: Get tested at least once in your  life.  STIs (sexually transmitted infections)  Before age 24: Ask your care team if you should be screened for STIs.  After age 24: Get screened for STIs if you're at risk. You are at risk for STIs (including HIV) if:  You are sexually active with more than one person.  You don't use condoms every time.  You or a partner was diagnosed with a sexually transmitted infection.  If you are at risk for HIV, ask about PrEP medicine to prevent HIV.  Get tested for HIV at least once in your life, whether you are at risk for HIV or not.  Cancer screening tests  Cervical cancer screening: If you have a cervix, begin getting regular cervical cancer screening tests starting at age 21.  Breast cancer scan (mammogram): If you've ever had breasts, begin having regular mammograms starting at age 40. This is a scan to check for breast cancer.  Colon cancer screening: It is important to start screening for colon cancer at age 45.  Have a colonoscopy test every 10 years (or more often if you're at risk) Or, ask your provider about stool tests like a FIT test every year or Cologuard test every 3 years.  To learn more about your testing options, visit:   https://[email or web address]/088432.pdf.  For help making a decision, visit:   https://bit.ly/ak79328.  Prostate cancer screening test: If you have a prostate, ask your care team if a prostate cancer screening test (PSA) at age 55 is right for you.  Lung cancer screening: If you are a current or former smoker ages 50 to 80, ask your care team if ongoing lung cancer screenings are right for you.  For informational purposes only. Not to replace the advice of your health care provider. Copyright   2023 [employer]. All rights reserved. Clinically reviewed by the [facility] Transitions Program. ViewRay [id number] - REV 01/24.    Preventing Falls: Care Instructions  Injuries and health problems such as trouble walking or poor eyesight can increase your risk of falling. So can  some medicines. But there are things you can do to help prevent falls. You can exercise to get stronger. You can also arrange your home to make it safer.    Talk to your doctor about the medicines you take. Ask if any of them increase the risk of falls and whether they can be changed or stopped.   Try to exercise regularly. It can help improve your strength and balance. This can help lower your risk of falling.     Practice fall safety and prevention.    Wear low-heeled shoes that fit well and give your feet good support. Talk to your doctor if you have foot problems that make this hard.  Carry a cellphone or wear a medical alert device that you can use to call for help.  Use stepladders instead of chairs to reach high objects. Don't climb if you're at risk for falls. Ask for help, if needed.  Wear the correct eyeglasses, if you need them.    Make your home safer.    Remove rugs, cords, clutter, and furniture from walkways.  Keep your house well lit. Use night-lights in hallways and bathrooms.  Install and use sturdy handrails on stairways.  Wear nonskid footwear, even inside. Don't walk barefoot or in socks without shoes.    Be safe outside.    Use handrails, curb cuts, and ramps whenever possible.  Keep your hands free by using a shoulder bag or backpack.  Try to walk in well-lit areas. Watch out for uneven ground, changes in pavement, and debris.  Be careful in the winter. Walk on the grass or gravel when sidewalks are slippery. Use de-icer on steps and walkways. Add non-slip devices to shoes.    Put grab bars and nonskid mats in your shower or tub and near the toilet. Try to use a shower chair or bath bench when bathing.   Get into a tub or shower by putting in your weaker leg first. Get out with your strong side first. Have a phone or medical alert device in the bathroom with you.   Where can you learn more?  Go to https://www.Baboo.net/patiented  Enter G117 in the search box to learn more about  "\"Preventing Falls: Care Instructions.\"  Current as of: July 18, 2023               Content Version: 13.8    0282-1374 [employer].   Care instructions adapted under license by your healthcare professional. If you have questions about a medical condition or this instruction, always ask your healthcare professional. [employer] disclaims any warranty or liability for your use of this information.      Learning About Stress  What is stress?     Stress is your body's response to a hard situation. Your body can have a physical, emotional, or mental response. Stress is a fact of life for most people, and it affects everyone differently. What causes stress for you may not be stressful for someone else.  A lot of things can cause stress. You may feel stress when you go on a job interview, take a test, or run a race. This kind of short-term stress is normal and even useful. It can help you if you need to work hard or react quickly. For example, stress can help you finish an important job on time.  Long-term stress is caused by ongoing stressful situations or events. Examples of long-term stress include long-term health problems, ongoing problems at work, or conflicts in your family. Long-term stress can harm your health.  How does stress affect your health?  When you are stressed, your body responds as though you are in danger. It makes hormones that speed up your heart, make you breathe faster, and give you a burst of energy. This is called the fight-or-flight stress response. If the stress is over quickly, your body goes back to normal and no harm is done.  But if stress happens too often or lasts too long, it can have bad effects. Long-term stress can make you more likely to get sick, and it can make symptoms of some diseases worse. If you tense up when you are stressed, you may develop neck, shoulder, or low back pain. Stress is linked to high blood pressure and heart disease.  Stress also harms " your emotional health. It can make you chavarria, tense, or depressed. Your relationships may suffer, and you may not do well at work or school.  What can you do to manage stress?  You can try these things to help manage stress:   Do something active. Exercise or activity can help reduce stress. Walking is a great way to get started. Even everyday activities such as housecleaning or yard work can help.  Try yoga or john chi. These techniques combine exercise and meditation. You may need some training at first to learn them.  Do something you enjoy. For example, listen to music or go to a movie. Practice your hobby or do volunteer work.  Meditate. This can help you relax, because you are not worrying about what happened before or what may happen in the future.  Do guided imagery. Imagine yourself in any setting that helps you feel calm. You can use online videos, books, or a teacher to guide you.  Do breathing exercises. For example:  From a standing position, bend forward from the waist with your knees slightly bent. Let your arms dangle close to the floor.  Breathe in slowly and deeply as you return to a standing position. Roll up slowly and lift your head last.  Hold your breath for just a few seconds in the standing position.  Breathe out slowly and bend forward from the waist.  Let your feelings out. Talk, laugh, cry, and express anger when you need to. Talking with supportive friends or family, a counselor, or a christel leader about your feelings is a healthy way to relieve stress. Avoid discussing your feelings with people who make you feel worse.  Write. It may help to write about things that are bothering you. This helps you find out how much stress you feel and what is causing it. When you know this, you can find better ways to cope.  What can you do to prevent stress?  You might try some of these things to help prevent stress:  Manage your time. This helps you find time to do the things you want and need to  "do.  Get enough sleep. Your body recovers from the stresses of the day while you are sleeping.  Get support. Your family, friends, and community can make a difference in how you experience stress.  Limit your news feed. Avoid or limit time on social media or news that may make you feel stressed.  Do something active. Exercise or activity can help reduce stress. Walking is a great way to get started.  Where can you learn more?  Go to https://www.elarm.net/patiented  Enter N032 in the search box to learn more about \"Learning About Stress.\"  Current as of: February 26, 2023               Content Version: 13.8    7810-0963 [employer].   Care instructions adapted under license by your healthcare professional. If you have questions about a medical condition or this instruction, always ask your healthcare professional. [employer] disclaims any warranty or liability for your use of this information.      "

## 2024-02-22 PROBLEM — R73.03 PREDIABETES: Status: ACTIVE | Noted: 2024-02-22

## 2024-02-22 LAB — CCP AB SER IA-ACNC: 0.7 U/ML

## 2024-03-05 ENCOUNTER — ANCILLARY PROCEDURE (OUTPATIENT)
Dept: MAMMOGRAPHY | Facility: CLINIC | Age: 64
End: 2024-03-05
Attending: INTERNAL MEDICINE
Payer: COMMERCIAL

## 2024-03-05 DIAGNOSIS — Z12.31 ENCOUNTER FOR SCREENING MAMMOGRAM FOR BREAST CANCER: ICD-10-CM

## 2024-03-05 PROCEDURE — 77067 SCR MAMMO BI INCL CAD: CPT | Mod: TC | Performed by: RADIOLOGY

## 2024-03-05 PROCEDURE — 77063 BREAST TOMOSYNTHESIS BI: CPT | Mod: TC | Performed by: RADIOLOGY

## 2024-03-12 ENCOUNTER — HOSPITAL ENCOUNTER (OUTPATIENT)
Dept: ULTRASOUND IMAGING | Facility: CLINIC | Age: 64
Discharge: HOME | End: 2024-03-12
Attending: INTERNAL MEDICINE
Payer: COMMERCIAL

## 2024-03-12 ENCOUNTER — HOSPITAL ENCOUNTER (OUTPATIENT)
Dept: MAMMOGRAPHY | Facility: CLINIC | Age: 64
Discharge: HOME | End: 2024-03-12
Attending: INTERNAL MEDICINE
Payer: COMMERCIAL

## 2024-03-12 DIAGNOSIS — R92.8 ABNORMAL MAMMOGRAM: ICD-10-CM

## 2024-03-12 PROCEDURE — 76642 ULTRASOUND BREAST LIMITED: CPT | Mod: RT

## 2024-03-12 PROCEDURE — 77065 DX MAMMO INCL CAD UNI: CPT | Mod: RT

## 2024-03-19 ENCOUNTER — ANCILLARY PROCEDURE (OUTPATIENT)
Dept: BONE DENSITY | Facility: CLINIC | Age: 64
End: 2024-03-19
Attending: INTERNAL MEDICINE
Payer: COMMERCIAL

## 2024-03-19 DIAGNOSIS — Z78.0 POST-MENOPAUSAL: ICD-10-CM

## 2024-03-19 PROCEDURE — 77080 DXA BONE DENSITY AXIAL: CPT | Mod: TC | Performed by: PHYSICIAN ASSISTANT

## 2024-09-11 NOTE — PROGRESS NOTES
Rheumatology Clinic Visit  [facility]  Gerardo Sadler M.D.     Korin Islas MRN# [id number]   YOB: 1960 Age: 64 year old   Date of Visit: 09/11/2024  Primary care provider: Christine Turner          Assessment and Plan:     # Chronic cervicalgia, degenerative disc disease  # Right greater than left impingement syndrome, history of AC joint arthritis  # Sciatica, left  # Carpal tunnel syndrome, mild/moderate  # Chronic low back pain, degenerative disc disease  # Borderline positive rheumatoid factor, cyclic citrullinated peptide antibodies negative    The majority of musculoskeletal symptoms are activity-worsened or noted at night.  There are no symptoms of systemic inflammatory disease.  Physical exam shows complete absence of joint disease in the hands, ankles, or feet.  There is pain limited cervical rotation; right shoulder shows impingement.  Tenderness at lateral epicondyle right greater than left elbow.  Crepitus at the knees without effusion.  Lumbar spine flexion is limited and painful.    Data: February 2024 comprehensive metabolic panel normal; hemoglobin A1c slightly elevated at 6.0; vitamin D normal; CBC normal, sedimentation rate 9.  In February 2024, rheumatoid factor was borderline positive at 16 international units; cyclic citrullinated peptide antibodies negative.  Imaging: Chest x-ray was clear with no obvious pathology of the glenohumeral joints    Discussion: Musculoskeletal symptoms are attributable to a combination of early osteoarthritis in shoulders elbows and knees, impingement syndrome, right greater than left shoulders, cervicalgia related to longstanding degenerative disc disease, established lumbosacral degenerative disc disease.  There is no evidence by history or exam for a systemic inflammatory etiology for symptoms.  I recommend focus on symptomatic, joint targeted treatment for neck, shoulders, low back.  We discussed my impression that despite  dramatic improvement of neck pain with Medrol in early 2024, there is no indication for chronic immunomodulatory or immunosuppressive therapy.    We discussed in clinic    Diagnosis:  1.  Neck pain, chronic, related to degenerative disc disease  2.  Right greater than left shoulder pain, associated with impingement  3.  Low positive rheumatoid factor, does not indicate the presence of rheumatoid arthritis; majority of symptoms can be attributed to local factors including degenerative disc disease, rotator cuff disease, and osteoarthritis of shoulders and knees.    Plan:  1.  Referral to orthopedics sports medicine for consideration of local treatment of shoulder and neck pain  2.  Referral to physical therapy for instruction in a home exercise program for rotator cuff tendinitis  3.  Cervical spine films, rule out erosions  4.  Medrol Dosepak for 7 days.  Medrol Dosepak may be repeated once every 3 to 4 months for flares of neck or joint pain  5.  Trial of Voltaren 1% gel applied 3-4 times daily to sore joints such as right shoulder and elbows  6.  Continue Advil 400 mg or Aleve 440 mg once or twice daily for joint pain.  If using Advil and Aleve regularly, take omeprazole stomach acid reducer 20 mg once daily for 30 days    RTC: steve Sadler MD  Staff Rheumatologist, [facility]    On the day of the encounter, a total of 65 minutes was spent in chart review, and in counseling and coordination of care, regarding the patient's complex medical problem of rotator cuff tendinitis, degenerative disc disease and cervicalgia, osteoarthritis, knees and shoulders, low positive rheumatoid factor.    Orders Placed This Encounter   Procedures    XR Cervical Spine w Flex/Ext G/E 4 Views    Orthopedic  Referral    Physical Therapy  Referral             History of Present Illness:   Korin Islas presents for evaluation of rheumatoid arthritis. Referred by Dr. Turner.    Interval history  "September 12, 2024    Today, she reports longstanding neck pain.  She had gradaul onset of pain greater than 15 years, \"I could hardly lift my head\".  She was diagnosed with DDD. She had multiple injections and Ptx.  She had marked improvement. In recent months/years, she has noted recurrence of steady posterior neck pain. Pain is much worse after gardening/yard work. Pain increases over the course of a workday at a computer. In the mornings, neck pain is a 5/10.    She also notes \"all my joints\", including her shoulders, elbows, wrists, hurt at night. Sleep is frequently disrupted. She takes aleve/advil, takes 2 at at time, she uses daily sometimes bid. No GI AE with NSAIDs. She does HEP prescribed by [facility] years ago.    R shoulder is more painful than left. Elbows and knees are equally painful.  R elbow awakens her at night with \"burning\" discomfort, there is no visible swelling or redness.    Low back discomfort with L sided sciatica waxes and wanes.   She has had 2 LS spine surgeries, including L5-S1 fusion. She continues under care of Dr. Rod.  In March 2024, Dr. Rod presceribed Medrol dose packs. She had significant relief, but had recurrence after 5 months.    She was dx'd with \"carpal tunnel\" years ago. Se was offeredd surgery but declined. Still has occasional numbness in fingers.   No problems with fingers, ankles, toes.    In the mornings, her neck and shoulders are most symptomatic.     Works full time as .         Review of Systems:     Constitutional: negative  Skin: negative  Eyes: negative  Ears/Nose/Throat:frequent \"sinus\" drainage.  Respiratory: No shortness of breath, dyspnea on exertion, cough, or hemoptysis  Cardiovascular: negative  Gastrointestinal: negative  Genitourinary: negative  Musculoskeletal: negative  Neurologic: negative  Psychiatric: negative  Hematologic/Lymphatic/Immunologic: negative  Endocrine: negative         Active Problem List:     Patient Active Problem List    " Diagnosis Date Noted    Prediabetes 2024     Priority: Medium    Primary insomnia 2024     Priority: Medium     Formatting of this note might be different from the original.   Waking up every hour because her body goes numb.   No history of sleep study.   No medications      Degeneration of cervical intervertebral disc 2023     Priority: Medium     Formatting of this note might be different from the original.  s/p injection 2006 for ongoing pain- medications tried: tramadol and gabapentin  MRI- 2012   Last injection- 3/2008-2012   Physical therapy last done years ago.    Current medications: Vicodin (ran out)      Spinal stenosis of cervical region 10/02/2018     Priority: Medium     Formatting of this note might be different from the original.  TCO diagnosis C3-C4 and C4-C5 bilateral foraminal stenosis with mild to moderate central stenosis and no signs of myelomalacia      ASCUS of cervix with negative high risk HPV 2013     Priority: Medium     Formatting of this note might be different from the original.    ASCUS/HPV negative.    2017 NIL/HPV negative      Plan: Routine screening. Pap/HPV due 2022      Hyperlipidemia with target low density lipoprotein (LDL) cholesterol less than 130 mg/dL 2008     Priority: Medium     Formatting of this note might be different from the original.  10/25/2013  Chol: 231    T    HDL:   49  LDL:  142     Recommendations:      Vitamin D deficiency 2008     Priority: Medium     Formatting of this note might be different from the original.     Vit d 21.6 10/28/2013 18.6   Recommendations:     Current medications: 5000 International Units 2-3 times per week.      Carpal tunnel syndrome of left wrist 2008     Priority: Medium     Formatting of this note might be different from the original.  EMG 2008 left mild median neuropathy EMG 2018 normal.   Therapies: none, no bracing.   Current medications: none.               Past Medical History:     Past Medical History:   Diagnosis Date    Arthritis     neck, wrists     Past Surgical History:   Procedure Laterality Date    BACK SURGERY       Cataract surgery in early 60s       Social History:     Social History     Socioeconomic History    Marital status:      Spouse name: Not on file    Number of children: Not on file    Years of education: Not on file    Highest education level: Not on file   Occupational History    Not on file   Tobacco Use    Smoking status: Former     Current packs/day: 0.00     Average packs/day: 1 pack/day for 25.0 years (25.0 ttl pk-yrs)     Types: Cigarettes     Start date:      Quit date:      Years since quittin.7    Smokeless tobacco: Never   Vaping Use    Vaping status: Never Used   Substance and Sexual Activity    Alcohol use: Not Currently     Comment: 0-1 per week    Drug use: Never    Sexual activity: Yes     Partners: Male   Other Topics Concern    Not on file   Social History Narrative    Not on file     Social Determinants of Health     Financial Resource Strain: Low Risk  (2024)    Financial Resource Strain     Within the past 12 months, have you or your family members you live with been unable to get utilities (heat, electricity) when it was really needed?: No   Food Insecurity: Low Risk  (2024)    Food Insecurity     Within the past 12 months, did you worry that your food would run out before you got money to buy more?: No     Within the past 12 months, did the food you bought just not last and you didn t have money to get more?: No   Transportation Needs: Low Risk  (2024)    Transportation Needs     Within the past 12 months, has lack of transportation kept you from medical appointments, getting your medicines, non-medical meetings or appointments, work, or from getting things that you need?: No   Physical Activity: Inactive (2024)    Exercise Vital Sign     Days of Exercise per Week: 0 days      Minutes of Exercise per Session: 0 min   Stress: Stress Concern Present (2/20/2024)    Citizen of [place] of Occupational Health - Occupational Stress Questionnaire     Feeling of Stress : Rather much   Social Connections: Unknown (2/20/2024)    Social Connection and Isolation Panel [NHANES]     Frequency of Communication with Friends and Family: Not on file     Frequency of Social Gatherings with Friends and Family: Once a week     Attends Taoism Services: Not on file     Active Member of Clubs or Organizations: Not on file     Attends Club or Organization Meetings: Not on file     Marital Status: Not on file   Interpersonal Safety: Low Risk  (2/21/2024)    Interpersonal Safety     Do you feel physically and emotionally safe where you currently live?: Yes     Within the past 12 months, have you been hit, slapped, kicked or otherwise physically hurt by someone?: No     Within the past 12 months, have you been humiliated or emotionally abused in other ways by your partner or ex-partner?: No   Housing Stability: Low Risk  (2/20/2024)    Housing Stability     Do you have housing? : Yes     Are you worried about losing your housing?: No          Family History:     Family History   Problem Relation Age of Onset    Breast Cancer Mother     Cancer Mother         breast cancer-60, lung    Cancer Father         esophageal    Breast Cancer Sister         double masectomy    Throat cancer Sister     No Known Problems Maternal Grandmother     Cancer Maternal Grandfather         bone    Parkinsonism Paternal Grandmother     No Known Problems Paternal Grandfather             Allergies:   No Known Allergies         Medications:     Current Outpatient Medications   Medication Sig Dispense Refill    citalopram (CELEXA) 10 MG tablet Take 1 tablet (10 mg) by mouth daily 90 tablet 3    COMIRNATY 30 MCG/0.3ML injection               Physical Exam:   There were no vitals taken for this visit.  Wt Readings from Last 6 Encounters:    02/21/24 77.6 kg (171 lb)   01/03/24 77.1 kg (170 lb)   05/12/23 77.1 kg (170 lb)   12/29/22 78 kg (172 lb)   11/02/21 78.5 kg (173 lb)   08/18/20 74.8 kg (165 lb)     There is no height or weight on file to calculate BMI.  Constitutional: well-developed, appearing stated age; cooperative  Eyes: nl EOM, PERRLA, vision, conjunctiva, sclera  ENT: nl external ears, nose, hearing, lips, teeth, gums, throat  No mucous membrane lesions, normal saliva pool  Neck: no mass or thyroid enlargement  Resp: Breathing unlabored  MS: Impingement, right shoulder, pain limited lumbar flexion, no evidence of synovitis, altered range of motion in hands, wrists, ankles, or feet.  Skin: no nail pitting, alopecia, rash, nodules or lesions  Neuro: nl cranial nerves, strength, sensation, DTRs.   Psych: nl judgement, orientation, memory, affect.         Data:     @      Latest Ref Rng & Units 12/29/2022     9:43 AM 2/21/2024    10:32 AM   RHEUM RESULTS   Albumin 3.5 - 5.2 g/dL 4.3  4.5    ALT 0 - 50 U/L 28  28    AST 0 - 45 U/L 27  24    Creatinine 0.51 - 0.95 mg/dL 0.78  0.74    CRP Inflammation <5.00 mg/L  5.08    GFR Estimate >60 mL/min/1.73m2 84  89    Hematocrit 35.0 - 47.0 %  42.3    Hemoglobin 11.7 - 15.7 g/dL  13.6    Hepatitis C Antibody Nonreactive  Nonreactive    WBC 4.0 - 11.0 10e3/uL  6.1    RBC Count 3.80 - 5.20 10e6/uL  4.74    RDW 10.0 - 15.0 %  12.7    MCHC 31.5 - 36.5 g/dL  32.2    MCV 78 - 100 fL  89    Platelet Count 150 - 450 10e3/uL  217    Sed Rate 0 - 30 mm/hr  9        Rheumatoid Factor   Date Value Ref Range Status   02/21/2024 16 (H) <14 IU/mL Final   ,   Cyclic Citrullinated Peptide Antibody IgG   Date Value Ref Range Status   02/21/2024 0.7 <7.0 U/mL Final     Comment:     Negative   ,  ,  ,  ,  ,  ,  ,  ,  ,  ,  ,  ,  ,  ,  ,  ,  ,  ,  ,  ,  ,  ,  ,  ,  ,  ,  ,  ,  ,  ,  ,  ,  ,  ,  ,  ,  ,  ,  ,  ,  ,  ,  ,

## 2024-09-12 ENCOUNTER — OFFICE VISIT (OUTPATIENT)
Dept: RHEUMATOLOGY | Facility: CLINIC | Age: 64
End: 2024-09-12
Attending: INTERNAL MEDICINE
Payer: COMMERCIAL

## 2024-09-12 VITALS
BODY MASS INDEX: 30.48 KG/M2 | DIASTOLIC BLOOD PRESSURE: 72 MMHG | SYSTOLIC BLOOD PRESSURE: 122 MMHG | OXYGEN SATURATION: 94 % | HEART RATE: 64 BPM | WEIGHT: 172 LBS | HEIGHT: 63 IN

## 2024-09-12 DIAGNOSIS — M54.2 NECK PAIN ON LEFT SIDE: ICD-10-CM

## 2024-09-12 DIAGNOSIS — M05.742 RHEUMATOID ARTHRITIS INVOLVING BOTH HANDS WITH POSITIVE RHEUMATOID FACTOR (H): ICD-10-CM

## 2024-09-12 DIAGNOSIS — G89.29 CHRONIC PAIN OF BOTH SHOULDERS: ICD-10-CM

## 2024-09-12 DIAGNOSIS — M25.511 CHRONIC PAIN OF BOTH SHOULDERS: ICD-10-CM

## 2024-09-12 DIAGNOSIS — M25.512 CHRONIC PAIN OF BOTH SHOULDERS: ICD-10-CM

## 2024-09-12 DIAGNOSIS — M48.02 SPINAL STENOSIS OF CERVICAL REGION: Primary | ICD-10-CM

## 2024-09-12 DIAGNOSIS — M05.741 RHEUMATOID ARTHRITIS INVOLVING BOTH HANDS WITH POSITIVE RHEUMATOID FACTOR (H): ICD-10-CM

## 2024-09-12 PROCEDURE — 99205 OFFICE O/P NEW HI 60 MIN: CPT | Performed by: INTERNAL MEDICINE

## 2024-09-12 RX ORDER — METHYLPREDNISOLONE 4 MG
TABLET, DOSE PACK ORAL
Qty: 21 TABLET | Refills: 0 | Status: SHIPPED | OUTPATIENT
Start: 2024-09-12

## 2024-09-12 RX ORDER — METHYLPREDNISOLONE 4 MG
TABLET, DOSE PACK ORAL
Qty: 21 TABLET | Refills: 0 | Status: SHIPPED | OUTPATIENT
Start: 2024-09-12 | End: 2024-09-12

## 2024-09-12 ASSESSMENT — PAIN SCALES - GENERAL: PAINLEVEL: MODERATE PAIN (5)

## 2024-09-12 NOTE — NURSING NOTE
"Chief Complaint   Patient presents with    New Patient     Rheumatoid arthritis involving both hands with positive rheumatoid factor (H)       Vitals:    09/12/24 0748   BP: 122/72   BP Location: Left arm   Patient Position: Sitting   Cuff Size: Adult Large   Pulse: 64   SpO2: 94%   Weight: 78 kg (172 lb)   Height: 1.6 m (5' 2.99\")       Body mass index is 30.48 kg/m .     RAPHAEL Epps   "

## 2024-09-12 NOTE — PATIENT INSTRUCTIONS
Diagnosis:  1.  Neck pain  2.  Right greater than left shoulder pain, associated with impingement  3.  Low positive rheumatoid factor, does not indicate the presence of rheumatoid arthritis; majority of symptoms can be attributed to local factors including degenerative disc disease, rotator cuff disease, and osteoarthritis of shoulders and knees.    Plan:  1.  Referral to orthopedics sports medicine for consideration of local treatment of shoulder and neck pain  2.  Referral to physical therapy for instruction in a home exercise program for rotator cuff tendinitis  3.  Cervical spine films, rule out erosions  4.  Medrol Dosepak for 7 days.  Medrol Dosepak may be repeated once every 3 to 4 months for flares of neck or joint pain  5.  Trial of Voltaren 1% gel applied 3-4 times daily to sore joints such as right shoulder and elbows  6.  Continue Advil 400 mg or Aleve 440 mg once or twice daily for joint pain.  If using Advil and Aleve regularly, take omeprazole stomach acid reducer 20 mg once daily for 30 days

## 2024-09-16 ENCOUNTER — PATIENT OUTREACH (OUTPATIENT)
Dept: CARE COORDINATION | Facility: CLINIC | Age: 64
End: 2024-09-16
Payer: COMMERCIAL

## 2024-09-23 ENCOUNTER — OFFICE VISIT (OUTPATIENT)
Dept: SLEEP MEDICINE | Facility: CLINIC | Age: 64
End: 2024-09-23
Payer: COMMERCIAL

## 2024-09-23 VITALS
SYSTOLIC BLOOD PRESSURE: 116 MMHG | BODY MASS INDEX: 29.02 KG/M2 | OXYGEN SATURATION: 96 % | HEART RATE: 65 BPM | DIASTOLIC BLOOD PRESSURE: 66 MMHG | HEIGHT: 64 IN | RESPIRATION RATE: 12 BRPM | WEIGHT: 170 LBS

## 2024-09-23 DIAGNOSIS — R06.83 SNORING: ICD-10-CM

## 2024-09-23 DIAGNOSIS — G47.00 INSOMNIA, UNSPECIFIED TYPE: ICD-10-CM

## 2024-09-23 DIAGNOSIS — R06.81 WITNESSED APNEIC SPELLS: ICD-10-CM

## 2024-09-23 DIAGNOSIS — R40.0 DAYTIME SOMNOLENCE: ICD-10-CM

## 2024-09-23 DIAGNOSIS — G47.33 OSA (OBSTRUCTIVE SLEEP APNEA): Primary | ICD-10-CM

## 2024-09-23 PROCEDURE — 99213 OFFICE O/P EST LOW 20 MIN: CPT | Performed by: NURSE PRACTITIONER

## 2024-09-23 NOTE — PROGRESS NOTES
Sleep Medicine: Follow-Up Visit    Chief Complaint   Patient presents with    Snoring     No study or cpap       Korin Islas is a 64-year-old female with a PMH pertinent for rheumatoid arthritis, HLD, depression with anxiety, cervical DJD, and overweight who comes in today for follow-up of ongoing symptoms of sleep disordered breathing.  She was last seen in a virtual visit with me on 5/12/2023 and an order for overnight polysomnography testing was placed, however, the patient reports that she never underwent testing at that time.  She reports symptoms of very loud snoring, wakes due to snoring/snort, morning headaches, difficulty staying asleep/sleep maintenance, daytime somnolence, recurring nightmares, limbs go numb with lateral sleep (shoulder issues), and nonrestorative sleep for several years     Bedtime: 9:30-10 PM.  Wake time: 6 AM. Falls asleep in few minutes. Wakes 6 times per night for 10-15 minutes.  Naps: 2-3 times per week for 15-30 minutes.   Patient gets approximately 6 hours of sleep each night.    SCALES:  EPWORTH SLEEPINESS SCALE    Sitting and reading 3   Watching TV 3   Sitting, inactive in a public place (theatre or mtg.) 0    As a passenger in a car 2   Lying down to rest in the afternoon when circumstance permit 3   Sitting and talking to someone 0   Sitting quietly after lunch without alcohol 0   In a car, while stopped for a few minutes in traffic 0   TOTAL SCORE 11     INSOMNIA SEVERITY INDEX     Difficulty falling asleep 2   Difficult staying asleep 3   Problems waking up to early 2   How SATISFIED/DISSATISFIED are you with your CURRENT sleep pattern? 3   How NOTICEABLE to others do you think your sleep pattern is in terms of your quality of life? 2   How WORRIED/DISTRESSED are you about your current sleep pattern? 3   To what extent do you consider your sleep problem to INTERFERE with your daily fuctioning(e.g. daytime fatigue, mood, ability to function at work/daily chores,  concentration, mood,etc.) CURRENTLY? 2   INSOMNIA SEVERITY INDEX TOTAL SCORE 17         Past medical/surgical history, family history, social history, medications and allergies were reviewed.      Problem List:  Patient Active Problem List    Diagnosis Date Noted    Prediabetes 2024     Priority: Medium    Primary insomnia 2024     Priority: Medium     Formatting of this note might be different from the original.   Waking up every hour because her body goes numb.   No history of sleep study.   No medications      Degeneration of cervical intervertebral disc 2023     Priority: Medium     Formatting of this note might be different from the original.  s/p injection 2006 for ongoing pain- medications tried: tramadol and gabapentin  MRI- 2012   Last injection- 3/2008-2012   Physical therapy last done years ago.    Current medications: Vicodin (ran out)      Spinal stenosis of cervical region 10/02/2018     Priority: Medium     Formatting of this note might be different from the original.  TCO diagnosis C3-C4 and C4-C5 bilateral foraminal stenosis with mild to moderate central stenosis and no signs of myelomalacia      ASCUS of cervix with negative high risk HPV 2013     Priority: Medium     Formatting of this note might be different from the original.    ASCUS/HPV negative.    2017 NIL/HPV negative      Plan: Routine screening. Pap/HPV due 2022      Hyperlipidemia with target low density lipoprotein (LDL) cholesterol less than 130 mg/dL 2008     Priority: Medium     Formatting of this note might be different from the original.  10/25/2013  Chol: 231    T    HDL:   49  LDL:  142     Recommendations:      Vitamin D deficiency 2008     Priority: Medium     Formatting of this note might be different from the original.     Vit d 21.6 10/28/2013 18.6   Recommendations:     Current medications: 5000 International Units 2-3 times per week.      Carpal tunnel  "syndrome of left wrist 05/20/2008     Priority: Medium     Formatting of this note might be different from the original.  EMG 5/2008 left mild median neuropathy EMG 9/2018 normal.   Therapies: none, no bracing.   Current medications: none.          Current Outpatient Medications   Medication Sig Dispense Refill    citalopram (CELEXA) 10 MG tablet Take 1 tablet (10 mg) by mouth daily 90 tablet 3    COMIRNATY 30 MCG/0.3ML injection  (Patient not taking: Reported on 9/23/2024)      diclofenac (VOLTAREN) 1 % topical gel Apply 2 g topically 4 times daily. To sore shoulders and elbows (Patient not taking: Reported on 9/23/2024) 100 g 1    methylPREDNISolone (MEDROL DOSEPAK) 4 MG tablet therapy pack Take per package instructions. Take once a day by mouth (Patient not taking: Reported on 9/23/2024) 21 tablet 0     No current facility-administered medications for this visit.       /66   Pulse 65   Resp 12   Ht 1.613 m (5' 3.5\")   Wt 77.1 kg (170 lb)   SpO2 96%   BMI 29.64 kg/m      Impression/Plan: (Addended 10/3/2024 - TW)  1. GUSTAVO (obstructive sleep apnea)  2. Snoring  3. Witnessed apneic spells  4. Daytime somnolence  5. Insomnia, unspecified type  - HST-Home Sleep Apnea Test - Noxturnal Returnable; Future  - HST-Home Sleep Apnea Test - Noxturnal Returnable; Future    Patient returns after approximately 1-1/2 years for consideration for evaluation for possible sleep disordered breathing.  Patient was last seen in a virtual visit with me on 5/12/2023 for symptoms suggestive of obstructive sleep apnea.  Her symptoms continue and have largely remained unchanged, however, her  can no longer tolerate her snoring and has provoked this return to the sleep center for evaluation.  The patient has requested home sleep apnea testing which appears to be appropriate based on her symptoms and medical history.    Korin Islas would like to review her results via [employer] after the sleep study and further " consideration regarding treatment options can be determined at that time.    20 minutes spent with patient, all of which were spent face-to-face counseling, consulting, chart review/documentation, and coordinating plan of care on the date of the encounter.      KAHLIL Edward CNP  Sleep Medicine      CC:  Christine Turner,      This note was written with the assistance of the Dragon voice-dictation technology software. The final document, although reviewed, may contain errors. For corrections, please contact the office.

## 2024-09-23 NOTE — NURSING NOTE
"Chief Complaint   Patient presents with    Snoring     No study or cpap       Initial /66   Pulse 65   Resp 12   Ht 1.613 m (5' 3.5\")   Wt 77.1 kg (170 lb)   SpO2 96%   BMI 29.64 kg/m   Estimated body mass index is 29.64 kg/m  as calculated from the following:    Height as of this encounter: 1.613 m (5' 3.5\").    Weight as of this encounter: 77.1 kg (170 lb).    Medication Reconciliation: complete    Neck circumference:  36 centimeters.      "

## 2024-09-23 NOTE — PATIENT INSTRUCTIONS
"          MY TREATMENT INFORMATION FOR SLEEP APNEA-  Korin Islas    DOCTOR : KAHLIL Edward CNP    Am I having a sleep study at a sleep center?  --->Due to normal delays, you will be contacted within 2-4 weeks to schedule    Am I having a home sleep study?  --->Watch the video for the device you are using:    -/drop off device-   https://www.Democracy Engine.com/watch?v=yGGFBdELGhk    -Disposable device sent out require phone/computer application-   https://www.[employer].com/watch?v=BCce_vbiwxE      Frequently asked questions:  1. What is Obstructive Sleep Apnea (GUSTAVO)? GUSTAVO is the most common type of sleep apnea. Apnea means, \"without breath.\"  Apnea is most often caused by narrowing or collapse of the upper airway as muscles relax during sleep.   Almost everyone has occasional apneas. Most people with sleep apnea have had brief interruptions at night frequently for many years.  The severity of sleep apnea is related to how frequent and severe the events are.   2. What are the consequences of GUSTAVO? Symptoms include: feeling sleepy during the day, snoring loudly, gasping or stopping of breathing, trouble sleeping, and occasionally morning headaches or heartburn at night.  Sleepiness can be serious and even increase the risk of falling asleep while driving. Other health consequences may include development of high blood pressure and other cardiovascular disease in persons who are susceptible. Untreated GUSTAVO  can contribute to heart disease, stroke and diabetes.   3. What are the treatment options? In most situations, sleep apnea is a lifelong disease that must be managed with daily therapy. Medications are not effective for sleep apnea and surgery is generally not considered until other therapies have been tried. Your treatment is your choice . Continuous Positive Airway (CPAP) works right away and is the therapy that is effective in nearly everyone. An oral device to hold your jaw forward is usually the next most " reliable option. Other options include postioning devices (to keep you off your back), weight loss, and surgery including a tongue pacing device. There is more detail about some of these options below.  4. Are my sleep studies covered by insurance? Although we will request verification of coverage, we advise you also check in advance of the study to ensure there is coverage.    Important tips for those choosing CPAP and similar devices  REMEMBER-IF YOU RECEIVE A CALL FROM  [phone]-->IT IS TO SETUP A DEVICE  For new devices, sign up for device MARS to monitor your device for your followup visits  We encourage you to utilize the PrintToPeer mars or website ( https://CafeMom.[employer] V[employer]/ ) to monitor your therapy progress and share the data with your healthcare team when you discuss your sleep apnea.                                                    Know your equipment:  CPAP is continuous positive airway pressure that prevents obstructive sleep apnea by keeping the throat from collapsing while you are sleeping. In most cases, the device is  smart  and can slowly self-adjusts if your throat collapses and keeps a record every day of how well you are treated-this information is available to you and your care team.  BPAP is bilevel positive airway pressure that keeps your throat open and also assists each breath with a pressure boost to maintain adequate breathing.  Special kinds of BPAP are used in patients who have inadequate breathing from lung or heart disease. In most cases, the device is  smart  and can slowly self-adjusts to assist breathing. Like CPAP, the device keeps a record of how well you are treated.  Your mask is your connection to the device. You get to choose what feels most comfortable and the staff will help to make sure if fits. Here: are some examples of the different masks that are available: Magnetic mask aids may assist with use but there are safety issues that should be addressed when  considering with magnets* ( see end of discussion).       Key points to remember on your journey with sleep apnea:  Sleep study.  PAP devices often need to be adjusted during a sleep study to show that they are effective and adjusted right.  Good tips to remember: Try wearing just the mask during a quiet time during the day so your body adapts to wearing it. A humidifier is recommended for comfort in most cases to prevent drying of your nose and throat. Allergy medication from your provider may help you if you are having nasal congestion.  Getting settled-in. It takes more than one night for most of us to get used to wearing a mask. Try wearing just the mask during a quiet time during the day so your body adapts to wearing it. A humidifier is recommended for comfort in most cases. Our team will work with you carefully on the first day and will be in contact within 4 days and again at 2 and 4 weeks for advice and remote device adjustments. Your therapy is evaluated by the device each day.   Use it every night. The more you are able to sleep naturally for 7-8 hours, the more likely you will have good sleep and to prevent health risks or symptoms from sleep apnea. Even if you use it 4 hours it helps. Occasionally all of us are unable to use a medical therapy, in sleep apnea, it is not dangerous to miss one night.   Communicate. Call our skilled team on the number provided on the first day if your visit for problems that make it difficult to wear the device. Over 2 out of 3 patients can learn to wear the device long-term with help from our team. Remember to call our team or your sleep providers if you are unable to wear the device as we may have other solutions for those who cannot adapt to mask CPAP therapy. It is recommended that you sleep your sleep provider within the first 3 months and yearly after that if you are not having problems.   Use it for your health. We encourage use of CPAP masks during daytime quiet  periods to allow your face and brain to adapt to the sensation of CPAP so that it will be a more natural sensation to awaken to at night or during naps. This can be very useful during the first few weeks or months of adapting to CPAP though it does not help medically to wear CPAP during wakefulness and  should not be used as a strategy just to meet guidelines.  Take care of your equipment. Make sure you clean your mask and tubing using directions every day and that your filter and mask are replaced as recommended or if they are not working.     *Masks with magnets:  Updated Contraindications  Masks with magnetic components are contraindicated for use by patients where they, or anyone in close physical contact while using the mask, have the following:   Active medical implants that interact with magnets (i.e., pacemakers, implantable cardioverter defibrillators (ICD), neurostimulators, cerebrospinal fluid (CSF) shunts, insulin/infusion pumps)   Metallic implants/objects containing ferromagnetic material (i.e., aneurysm clips/flow disruption devices, embolic coils, stents, valves, electrodes, implants to restore hearing or balance with implanted magnets, ocular implants, metallic splinters in the eye)  Updated Warning  Keep the mask magnets at a safe distance of at least 6 inches (150 mm) away from implants or medical devices that may be adversely affected by magnetic interference. This warning applies to you or anyone in close physical contact with your mask. The magnets are in the frame and lower headgear clips, with a magnetic field strength of up to 400mT. When worn, they connect to secure the mask but may inadvertently detach while asleep.  Implants/medical devices, including those listed within contraindications, may be adversely affected if they change function under external magnetic fields or contain ferromagnetic materials that attract/repel to magnetic fields (some metallic implants, e.g., contact lenses  with metal, dental implants, metallic cranial plates, screws, alon hole covers, and bone substitute devices). Consult your physician and  of your implant / other medical device for information on the potential adverse effects of magnetic fields.    BESIDES CPAP, WHAT OTHER THERAPIES ARE THERE?    Positioning Device  Positioning devices are generally used when sleep apnea is mild and only occurs on your back.This example shows a pillow that straps around the waist. It may be appropriate for those whose sleep study shows milder sleep apnea that occurs primarily when lying flat on one's back. Preliminary studies have shown benefit but effectiveness at home may need to be verified by a home sleep test. These devices are generally not covered by medical insurance.  Examples of devices that maintain sleeping on the back to prevent snoring and mild sleep apnea.    Belt type body positioner  http://GetGlue/    Electronic reminder  http://nightshifttherapy.[employer]/            Oral Appliance  What is oral appliance therapy?  An oral appliance device fits on your teeth at night like a retainer used after having braces. The device is made by a specialized dentist and requires several visits over 1-2 months before a manufactured device is made to fit your teeth and is adjusted to prevent your sleep apnea. Once an oral device is working properly, snoring should be improved. A home sleep test may be recommended at that time if to determine whether the sleep apnea is adequately treated.       Some things to remember:  -Oral devices are often, but not always, covered by your medical insurance. Be sure to check with your insurance provider.   -If you are referred for oral therapy, you will be given a list of specialized dentists to consider or you may choose to visit the Web site of the American Academy of Dental Sleep Medicine  -Oral devices are less likely to work if you have severe sleep apnea or are extremely  overweight.     More detailed information  An oral appliance is a small acrylic device that fits over the upper and lower teeth  (similar to a retainer or a mouth guard). This device slightly moves jaw forward, which moves the base of the tongue forward, opens the airway, improves breathing for effective treat snoring and obstructive sleep apnea in perhaps 7 out of 10 people .  The best working devices are custom-made by a dental device  after a mold is made of the teeth 1, 2, 3.  When is an oral appliance indicated?  Oral appliance therapy is recommended as a first-line treatment for patients with primary snoring, mild sleep apnea, and for patients with moderate sleep apnea who prefer appliance therapy to use of CPAP4, 5. Severity of sleep apnea is determined by sleep testing and is based on the number of respiratory events per hour of sleep.   How successful is oral appliance therapy?  The success rate of oral appliance therapy in patients with mild sleep apnea is 75-80% while in patients with moderate sleep apnea it is 50-70%. The chance of success in patients with severe sleep apnea is 40-50%. The research also shows that oral appliances have a beneficial effect on the cardiovascular health of GUSTAVO patients at the same magnitude as CPAP therapy7.  Oral appliances should be a second-line treatment in cases of severe sleep apnea, but if not completely successful then a combination therapy utilizing CPAP plus oral appliance therapy may be effective. Oral appliances tend to be effective in a broad range of patients although studies show that the patients who have the highest success are females, younger patients, those with milder disease, and less severe obesity. 3, 6.   Finding a dentist that practices dental sleep medicine  Specific training is available through the American Academy of Dental Sleep Medicine for dentists interested in working in the field of sleep. To find a dentist who is educated in  the field of sleep and the use of oral appliances, near you, visit the Web site of the American Academy of Dental Sleep Medicine.    References  1. Ami, et al. Objectively measured vs self-reported compliance during oral appliance therapy for sleep-disordered breathing. Chest 2013; 144(5): 2779-4244.  2. Sinan et al. Objective measurement of compliance during oral appliance therapy for sleep-disordered breathing. Thorax 2013; 68(1): 91-96.  3. Raul et al. Mandibular advancement devices in 620 men and women with GUSTAVO and snoring: tolerability and predictors of treatment success. Chest 2004; 125: 8206-8686.  4. Brittany, et al. Oral appliances for snoring and GUSTAVO: a review. Sleep 2006; 29: 244-262.  5. Guillermina et al. Oral appliance treatment for GUSTAVO: an update. J Clin Sleep Med 2014; 10(2): 215-227.  6. Jigna et al. Predictors of OSAH treatment outcome. J Dent Res 2007; 86: 1769-0159.      Weight Loss:   Your Body mass index is 29.64 kg/m .    Being overweight does not necessarily mean you will have health consequences.  Those who have BMI over 35 or over 27 with existing medical conditions carries greater risk.   Weight loss decreases severity of sleep apnea in most people with obesity. For those with mild obesity who have developed snoring with weight gain, even 15-30 pound weight loss can improve and occasionally milder eliminate sleep apnea.  Structured and life-long dietary and health habits are necessary to lose weight and keep healthier weight levels.     The Comprehensive Weight loss program offers all aspects of weight loss strategies including two Non-Surgical Weight Loss Programs: Medical Weight Management and our 24 Week Healthy Lifestyle Program:    Medical Weight Management: You will meet with a Medical Weight Management Provider, as well as a Registered Dietician. The program may include medication therapy, dietary education, recommended exercise and physical therapy programs,  monthly support group meetings, and possible psychological counseling. Follow up visits with the provider or dietician are scheduled based on your progress and needs.    24 Week Healthy Lifestyle Program: This unique program is designed to give you the support of weekly appointments and activities thru a 24-week period. It may include all of the components of the basic program (above), with the addition of 11 individual Health  Visits, 24-week access to the [facility].M.A[facility] website for over 700 online classes, and monthly support group meetings. This program has an out-of-pocket expense of $499 to cover the items that can not be billed to insurance (health coaches and [facility] access), and is non-refundable/non-transferable (you may be able to use a Health Savings Account; ask your HSA provider). There may be an optional meal replacement plan prescribed as well.   Surgical management achieves meaningful long-term weight loss and improvement in health risks in most patients with more severe obesity.      Sleep Apnea Surgery:    Surgery for obstructive sleep apnea is considered generally only when other therapies fail to work. Surgery may be discussed with you if you are having a difficult time tolerating CPAP and or when there is an abnormal structure that requires surgical correction.  Nose and throat surgeries often enlarge the airway to prevent collapse.  Most of these surgeries create pain for 1-2 weeks and up to half of the most common surgeries are not effective throughout life.  You should carefully discuss the benefits and drawbacks to surgery with your sleep provider and surgeon to determine if it is the best solution for you.   More information  Surgery for GUSTAVO is directed at areas that are responsible for narrowing or complete obstruction of the airway during sleep.  There are a wide range of procedures available to enlarge and/or stabilize the airway to prevent blockage of breathing in the three major  areas where it can occur: the palate, tongue, and nasal regions.  Successful surgical treatment depends on the accurate identification of the factors responsible for obstructive sleep apnea in each person.  A personalized approach is required because there is no single treatment that works well for everyone.  Because of anatomic variation, consultation with an examination by a sleep surgeon is a critical first step in determining what surgical options are best for each patient.  In some cases, examination during sedation may be recommended in order to guide the selection of procedures.  Patients will be counseled about risks and benefits as well as the typical recovery course after surgery. Surgery is typically not a cure for a person s GUSTAVO.  However, surgery will often significantly improve one s GUSTAVO severity (termed  success rate ).  Even in the absence of a cure, surgery will decrease the cardiovascular risk associated with OSA7; improve overall quality of life8 (sleepiness, functionality, sleep quality, etc).      Palate Procedures:  Patients with GUSTAVO often have narrowing of their airway in the region of their tonsils and uvula.  The goals of palate procedures are to widen the airway in this region as well as to help the tissues resist collapse.  Modern palate procedure techniques focus on tissue conservation and soft tissue rearrangement, rather than tissue removal.  Often the uvula is preserved in this procedure. Residual sleep apnea is common in patient after pharyngoplasty with an average reduction in sleep apnea events of 33%2.      Tongue Procedures:  ExamWhile patients are awake, the muscles that surround the throat are active and keep this region open for breathing. These muscles relax during sleep, allowing the tongue and other structures to collapse and block breathing.  There are several different tongue procedures available.  Selection of a tongue base procedure depends on characteristics seen on  physical exam.  Generally, procedures are aimed at removing bulky tissues in this area or preventing the back of the tongue from falling back during sleep.  Success rates for tongue surgery range from 50-62%3.    Hypoglossal Nerve Stimulation:  Hypoglossal nerve stimulation has recently received approval from the [place] Food and Drug Administration for the treatment of obstructive sleep apnea.  This is based on research showing that the system was safe and effective in treating sleep apnea6.  Results showed that the median AHI score decreased 68%, from 29.3 to 9.0. This therapy uses an implant system that senses breathing patterns and delivers mild stimulation to airway muscles, which keeps the airway open during sleep.  The system consists of three fully implanted components: a small generator (similar in size to a pacemaker), a breathing sensor, and a stimulation lead.  Using a small handheld remote, a patient turns the therapy on before bed and off upon awakening.    Candidates for this device must be greater than 18 years of age, have moderate to severe obstructive sleep apnea with less than 25% central events  (AHI between 15-65), BMI less than 35, have tried CPAP/oral appliance for at least 8 weeks without success, and have appropriate upper airway anatomy (determined by a sleep endoscopy performed by Dr. Ashu Paz or Dr. Reji Zaragoza).    Nasal Procedures:  Nasal obstruction can interfere with nasal breathing during the day and night.  Studies have shown that relief of nasal obstruction can improve the ability of some patients to tolerate positive airway pressure therapy for obstructive sleep apnea1.  Treatment options include medications such as nasal saline, topical corticosteroid and antihistamine sprays, and oral medications such as antihistamines or decongestants. Non-surgical treatments can include external nasal dilators for selected patients. If these are not successful by themselves,  surgery can improve the nasal airway either alone or in combination with these other options.        Combination Procedures:  Combination of surgical procedures and other treatments may be recommended, particularly if patients have more than one area of narrowing or persistent positional disease.  The success rate of combination surgery ranges from 66-80%2,3.    References  Lizandro MARIE. The Role of the Nose in Snoring and Obstructive Sleep Apnoea: An Update.  Eur Arch Otorhinolaryngol. 2011; 268: 1365-73.   Avtar SM; Nishant JA; Tess JR; Pallanch JF; Medina MB; Edwina SG; Onofre BOWERS. Surgical modifications of the upper airway for obstructive sleep apnea in adults: a systematic review and meta-analysis. SLEEP 2010;33(10):4733-7462. Hermilo HEARN. Hypopharyngeal surgery in obstructive sleep apnea: an evidence-based medicine review.  Arch Otolaryngol Head Neck Surg. 2006 Feb;132(2):206-13.  Sinan YH1, Johanna Y, Basil FELIBERTO. The efficacy of anatomically based multilevel surgery for obstructive sleep apnea. Otolaryngol Head Neck Surg. 2003 Oct;129(4):327-35.  Hermilo HEARN, Goldberg A. Hypopharyngeal Surgery in Obstructive Sleep Apnea: An Evidence-Based Medicine Review. Arch Otolaryngol Head Neck Surg. 2006 Feb;132(2):206-13.  Antonietta RONDON et al. Upper-Airway Stimulation for Obstructive Sleep Apnea.  N Engl J Med. 2014 Jan 9;370(2):139-49.  Caroline Y et al. Increased Incidence of Cardiovascular Disease in Middle-aged Men with Obstructive Sleep Apnea. Am J Respir Crit Care Med; 2002 166: 159-165  Portillo EM et al. Studying Life Effects and Effectiveness of Palatopharyngoplasty (SLEEP) study: Subjective Outcomes of Isolated Uvulopalatopharyngoplasty. Otolaryngol Head Neck Surg. 2011; 144: 623-631.        WHAT IF I ONLY HAVE SNORING?    Mandibular advancement devices, lateral sleep positioning, long-term weight loss and treatment of nasal allergies have been shown to improve snoring.  Exercising tongue muscles with a game  (https://[employer].R17/us/mars/soundly-reduce-snoring/qk9554467393) or stimulating the tongue during the day with a device (https://doi.org/10.3390/ine36589162) have improved snoring in some individuals.  https://www.[employer].R17/  https://www.sleepfoundation.org/best-anti-snoring-mouthpieces-and-mouthguards    Remember to Drive Safe... Drive Alive     Sleep health profoundly affects your health, mood, and your safety.  Thirty three percent of the population (one in three of us) is not getting enough sleep and many have a sleep disorder. Not getting enough sleep or having an untreated / undertreated sleep condition may make us sleepy without even knowing it. In fact, our driving could be dramatically impaired due to our sleep health. As your provider, here are some things I would like you to know about driving:     Here are some warning signs for impairment and dangerous drowsy driving:              -Having been awake more than 16 hours               -Looking tired               -Eyelid drooping              -Head nodding (it could be too late at this point)              -Driving for more than 30 minutes     Some things you could do to make the driving safer if you are experiencing some drowsiness:              -Stop driving and rest              -Call for transportation              -Make sure your sleep disorder is adequately treated     Some things that have been shown NOT to work when experiencing drowsiness while driving:              -Turning on the radio              -Opening windows              -Eating any  distracting  /  entertaining  foods (e.g., sunflower seeds, candy, or any other)              -Talking on the phone      Your decision may not only impact your life, but also the life of others. Please, remember to drive safe for yourself and all of us.

## 2024-09-25 ENCOUNTER — TELEPHONE (OUTPATIENT)
Dept: RHEUMATOLOGY | Facility: CLINIC | Age: 64
End: 2024-09-25
Payer: COMMERCIAL

## 2024-09-25 NOTE — TELEPHONE ENCOUNTER
Patient called.  She thought xrays were going to be ordered on her shoulders as well as her cervical spine.  Did you want to add them on?    Hazel Shields RN

## 2024-09-25 NOTE — TELEPHONE ENCOUNTER
[facility] Call Center    Phone Message    May a detailed message be left on voicemail: yes     Reason for Call: Other: Pt requesting a call back, to discuss xray orders. Per pt last visit with provider was told xray will be order for cervical spine, L and R shoulder.  The orders are only for her spine. Please follow up with pt.       Action Taken: Other: rheum      Travel Screening: Not Applicable     Date of Service:

## 2024-09-26 ENCOUNTER — ANCILLARY PROCEDURE (OUTPATIENT)
Dept: GENERAL RADIOLOGY | Facility: CLINIC | Age: 64
End: 2024-09-26
Attending: INTERNAL MEDICINE
Payer: COMMERCIAL

## 2024-09-26 DIAGNOSIS — M54.2 NECK PAIN ON LEFT SIDE: ICD-10-CM

## 2024-09-26 PROCEDURE — 72050 X-RAY EXAM NECK SPINE 4/5VWS: CPT | Mod: TC | Performed by: RADIOLOGY

## 2024-09-28 ASSESSMENT — ACTIVITIES OF DAILY LIVING (ADL)
PUTTING_ON_YOUR_PANTS: 2
REMOVING_SOMETHING_FROM_YOUR_BACK_POCKET: 3
PUTTING_ON_A_SHIRT_THAT_BUTTONS_DOWN_THE_FRONT: 3
TOUCHING_THE_BACK_OF_YOUR_NECK: 5
PLACING_AN_OBJECT_ON_A_HIGH_SHELF: 6
WASHING_YOUR_HAIR?: 2
PUTTING_ON_AN_UNDERSHIRT_OR_A_PULLOVER_SWEATER: 2
AT_ITS_WORST?: 8
WHEN_LYING_ON_THE_INVOLVED_SIDE: 8
CARRYING_A_HEAVY_OBJECT_OF_10_POUNDS: 8
REACHING_FOR_SOMETHING_ON_A_HIGH_SHELF: 6
PLEASE_INDICATE_YOR_PRIMARY_REASON_FOR_REFERRAL_TO_THERAPY:: SHOULDER
WASHING_YOUR_BACK: 8
PUSHING_WITH_THE_INVOLVED_ARM: 5

## 2024-09-30 ENCOUNTER — THERAPY VISIT (OUTPATIENT)
Dept: PHYSICAL THERAPY | Facility: CLINIC | Age: 64
End: 2024-09-30
Attending: INTERNAL MEDICINE
Payer: COMMERCIAL

## 2024-09-30 DIAGNOSIS — M25.512 CHRONIC PAIN OF BOTH SHOULDERS: Primary | ICD-10-CM

## 2024-09-30 DIAGNOSIS — G89.29 CHRONIC PAIN OF BOTH SHOULDERS: Primary | ICD-10-CM

## 2024-09-30 DIAGNOSIS — M25.511 CHRONIC PAIN OF BOTH SHOULDERS: Primary | ICD-10-CM

## 2024-09-30 PROCEDURE — 97161 PT EVAL LOW COMPLEX 20 MIN: CPT | Mod: GP | Performed by: PHYSICAL THERAPIST

## 2024-09-30 PROCEDURE — 97110 THERAPEUTIC EXERCISES: CPT | Mod: GP | Performed by: PHYSICAL THERAPIST

## 2024-09-30 NOTE — PROGRESS NOTES
PHYSICAL THERAPY EVALUATION  Type of Visit: Evaluation              Subjective       Presenting condition or subjective complaint: Pain in both shoulder's. Patient has a history of B shoulder pain, however a couple months ago she tried to move a basket ball hoop and it tipped on her R shoulder.  She gets numbness in both arms a night. She is getting 5-6 hours out of 8 hours of sleep at night. She gets numbness in both arms when she sleeps through the dorsal aspect of both arms and into the top of her hands. She has neck problems as well. Worse with: lifting and grabbing boxes on shelves, carrying something heavy. Towards the afternoon to evening it is worse. She is an  and does some shipping. Patient has a history of calcific tendonitis. She has tried volteran and a medrol dose pack for other conditions, however it did not make a difference for her shoulders. Has not tried ice or heat yet.  Patient has seen Dr. Rod previously for other orthopedic  conditions.     Date of onset: 09/12/24 (Chronic condition MD order date listed.)    Relevant medical history:     Dates & types of surgery: 2 lower back surgerys 20+ years ago    Prior diagnostic imaging/testing results: Other Left shoulder, calcification. On my chart. I had therapy for left shoulder.   Prior therapy history for the same diagnosis, illness or injury: Yes On mychart    Prior Level of Function  Transfers: Independent  Ambulation: Independent  ADL: Independent    Living Environment  Social support: With a significant other or spouse   Type of home: House   Stairs to enter the home: Yes 4 Is there a railing: Yes     Ramp: No   Stairs inside the home: Yes 4 Is there a railing: Yes     Help at home: None  Equipment owned:       Employment: Yes Office mgr  Hobbies/Interests: Lawn and garden    Patient goals for therapy: Find out whats causing the pain and fix it    Pain assessment: Location: Shoulder/Rating: Current: 5/10 worst in the last week:  8/10  Best: 3/10.       Objective   Shoulder Objective  Observation:  Shoulder's are equal at rest. No shoulder hike present with elevation of arm. Patient demonstrates rounded shoulders and forward head posture. Scapulothoracic dyskinesis is not present.  ROM:   Shoulder AROM   Motion Right  Left   Flexion 138 146   Abduction 95 98   External Rotation WNL WNL   Functional Internal Rotation Glute max Pain Glute Max Pain     All blank boxes in this table indicate negative special testing.    Special Tests Standing   Test Right Left   Speeds (Labral) NT NT   Neers (Impingement) + +   Horn Blowers (RC Infraspinatus)     Abdon Quintero (Impingement) + +   Bear Hug (RC subscap) + +   [place] Active Compression (Labral) NT NT   Full Can (RC)     Empty Can (RC) + +   Drop Arm (RC)     Lift Off (RC subscap)  NT NT   Palpation: Painful and tight to palpation B UT, B middle traps, B Rhomboid, B infraspinatus, B supraspinatus.  Shoulder PROM   Motion Right  Left   Flexion 160 with end range pain 170 with end range pain   Abduction NT NT   ER-90 90 90   IR-90 60 end range pain 60 end range pain     All blank boxes in this table indicate normal pain free joint mobility.   Shoulder Joint Mobility   Joint/Direction Right Left    GHJ Posterior Painful and slightly restricted  Painful and slightly restricted    GHJ Inferior     GHJ Anterior NT NT   AC Joint     SC Joint NT NT   Scapulothoracic Joint Limited retraction due to anterior tightness Limited retraction due to anterior tightness     Assessment & Plan   CLINICAL IMPRESSIONS  Medical Diagnosis: Chronic pain of both shoulders (M25.511, G89.29, M25.512)    Treatment Diagnosis: Chronic pain of both shoulders (M25.511, G89.29, M25.512)   Impression/Assessment: Patient is a 64 year old female with B shoulder complaints.  The following significant findings have been identified: Pain, Decreased ROM/flexibility, Decreased joint mobility, Decreased strength, Impaired muscle  performance, Decreased activity tolerance, and Impaired posture. These impairments interfere with their ability to perform self care tasks, work tasks, recreational activities, household chores, driving , household mobility, and community mobility as compared to previous level of function.     Clinical Decision Making (Complexity):  Clinical Presentation: Stable/Uncomplicated  Clinical Presentation Rationale: based on medical and personal factors listed in PT evaluation  Clinical Decision Making (Complexity): Low complexity    PLAN OF CARE  Treatment Interventions:  Modalities: Cryotherapy, Dry Needling, E-stim, Hot Pack, Mechanical Traction  Interventions: Gait Training, Manual Therapy, Neuromuscular Re-education, Therapeutic Activity, Therapeutic Exercise, Self-Care/Home Management    Long Term Goals     PT Goal 1  Goal Identifier: reaching  Goal Description: Patient will demonstrate unrestricted pain free reaching  Rationale: to maximize safety and independence with performance of ADLs and functional tasks;to maximize safety and independence within the home;to maximize safety and independence within the community;to maximize safety and independence with transportation;to maximize safety and independence with self cares  Target Date: 10/28/24      Frequency of Treatment: 1 time per week  Duration of Treatment: 4 weeks    Education Assessment:   Learner/Method: Patient;Listening;Demonstration;Reading;Pictures/Video;No Barriers to Learning  Education Comments: Educated on findings of exam and likely frequency/duration of PT POC.    Risks and benefits of evaluation/treatment have been explained.   Patient/Family/caregiver agrees with Plan of Care.     Evaluation Time:     PT Eval, Low Complexity Minutes (67691): 25  Signing Clinician: Mitra Robins PT        [facility]                                                                                   OUTPATIENT PHYSICAL  THERAPY      PLAN OF TREATMENT FOR OUTPATIENT REHABILITATION   Patient's Last Name, First Name, Korin Perez YOB: 1960   Provider's Name   [facility]   Medical Record No.  [id number]     Onset Date: 09/12/24 (Chronic condition MD order date listed.)  Start of Care Date: 09/30/24     Medical Diagnosis:  Chronic pain of both shoulders (M25.511, G89.29, M25.512)      PT Treatment Diagnosis:  Chronic pain of both shoulders (M25.511, G89.29, M25.512) Plan of Treatment  Frequency/Duration: 1 time per week/ 4 weeks    Certification date from 09/30/24 to 10/28/24         See note for plan of treatment details and functional goals     Mitra Robins, PT                         I CERTIFY THE NEED FOR THESE SERVICES FURNISHED UNDER        THIS PLAN OF TREATMENT AND WHILE UNDER MY CARE     (Physician attestation of this document indicates review and certification of the therapy plan).              Referring Provider:  Gerardo Sadler MD    Initial Assessment  See Epic Evaluation- Start of Care Date: 09/30/24

## 2024-10-02 ENCOUNTER — TELEPHONE (OUTPATIENT)
Dept: SLEEP MEDICINE | Facility: CLINIC | Age: 64
End: 2024-10-02
Payer: COMMERCIAL

## 2024-10-02 NOTE — TELEPHONE ENCOUNTER
[facility] Order Diagnosis Update Request    Patient Name:                        Korin Islas  :                                      1960  MRN:                                      [id number]    Dr. Caicedo,    The diagnosis code on the order for procedure HST  on date of service 10/15 is not meeting medical necessity based on payer guidelines. If you were to choose another diagnosis code and update the existing order, we will review it again.    For your reference, here is the link to the payer's policy:  https://www.cms.gov/medicare-coverage-database/view/article.aspx?[email or web address]&[email or web address]    Please note: medical policies are not authorizations and are set by the payer. Payers do not allow authorizations or appeals to these policies pre-service. Please reference the policy link above for passing diagnosis codes. We are not allowed to recommend which diagnosis to choose.    Please let us know how you wish to proceed as soon as possible. We may contact the patient if we do not receive a response.    Thank you,    Ami Puentes Prior Authorization        Beth Barthel   Authorizations [facility] Prior Authorizations  [address] [place] [address]  [email or web address]   [email or web address]  Office: [phone]    Fax: [phone]

## 2024-10-09 ENCOUNTER — THERAPY VISIT (OUTPATIENT)
Dept: PHYSICAL THERAPY | Facility: CLINIC | Age: 64
End: 2024-10-09
Payer: COMMERCIAL

## 2024-10-09 DIAGNOSIS — G89.29 CHRONIC PAIN OF BOTH SHOULDERS: Primary | ICD-10-CM

## 2024-10-09 DIAGNOSIS — M25.511 CHRONIC PAIN OF BOTH SHOULDERS: Primary | ICD-10-CM

## 2024-10-09 DIAGNOSIS — M25.512 CHRONIC PAIN OF BOTH SHOULDERS: Primary | ICD-10-CM

## 2024-10-09 PROCEDURE — 97110 THERAPEUTIC EXERCISES: CPT | Mod: GP | Performed by: PHYSICAL THERAPIST

## 2024-10-09 PROCEDURE — 97140 MANUAL THERAPY 1/> REGIONS: CPT | Mod: GP | Performed by: PHYSICAL THERAPIST

## 2024-10-14 ASSESSMENT — SLEEP AND FATIGUE QUESTIONNAIRES
HOW LIKELY ARE YOU TO NOD OFF OR FALL ASLEEP WHILE LYING DOWN TO REST IN THE AFTERNOON WHEN CIRCUMSTANCES PERMIT: HIGH CHANCE OF DOZING
HOW LIKELY ARE YOU TO NOD OFF OR FALL ASLEEP WHILE WATCHING TV: HIGH CHANCE OF DOZING
HOW LIKELY ARE YOU TO NOD OFF OR FALL ASLEEP WHILE SITTING AND TALKING TO SOMEONE: WOULD NEVER DOZE
HOW LIKELY ARE YOU TO NOD OFF OR FALL ASLEEP IN A CAR, WHILE STOPPED FOR A FEW MINUTES IN TRAFFIC: WOULD NEVER DOZE
HOW LIKELY ARE YOU TO NOD OFF OR FALL ASLEEP WHILE SITTING QUIETLY AFTER LUNCH WITHOUT ALCOHOL: WOULD NEVER DOZE
HOW LIKELY ARE YOU TO NOD OFF OR FALL ASLEEP WHILE SITTING INACTIVE IN A PUBLIC PLACE: WOULD NEVER DOZE
HOW LIKELY ARE YOU TO NOD OFF OR FALL ASLEEP WHEN YOU ARE A PASSENGER IN A CAR FOR AN HOUR WITHOUT A BREAK: SLIGHT CHANCE OF DOZING
HOW LIKELY ARE YOU TO NOD OFF OR FALL ASLEEP WHILE SITTING AND READING: HIGH CHANCE OF DOZING

## 2024-10-15 ENCOUNTER — OFFICE VISIT (OUTPATIENT)
Dept: SLEEP MEDICINE | Facility: CLINIC | Age: 64
End: 2024-10-15
Payer: COMMERCIAL

## 2024-10-15 DIAGNOSIS — G47.00 INSOMNIA, UNSPECIFIED TYPE: ICD-10-CM

## 2024-10-15 DIAGNOSIS — R06.83 SNORING: ICD-10-CM

## 2024-10-15 DIAGNOSIS — G47.33 OSA (OBSTRUCTIVE SLEEP APNEA): ICD-10-CM

## 2024-10-15 DIAGNOSIS — R06.81 WITNESSED APNEIC SPELLS: ICD-10-CM

## 2024-10-15 DIAGNOSIS — R40.0 DAYTIME SOMNOLENCE: ICD-10-CM

## 2024-10-15 PROCEDURE — G0399 HOME SLEEP TEST/TYPE 3 PORTA: HCPCS | Performed by: INTERNAL MEDICINE

## 2024-10-15 NOTE — PROGRESS NOTES
Pt is completing a home sleep test. Pt was instructed on how to put on the Noxturnal T3 device and associated equipment before going to bed and given the opportunity to practice putting it on before leaving the sleep center. Pt was reminded to bring the home sleep test kit back to the center tomorrow, at the scheduled time for download and reporting. Patient was instructed to complete study using the following treatment?  None  Neck circumference: 36 CM / 14.25 inches.  Device number: 11  Louisa Malcolm , Home Sleep Studies Specialist  [place]  / [facility]

## 2024-10-16 NOTE — PROGRESS NOTES
HST POST-STUDY QUESTIONNAIRE    What time did you go to bed?  22:00  How long do you think it took to fall asleep?  45 min  What time did you wake up to start the day?  06:00  Did you get up during the night at all?  Yes once  If you woke up, do you remember approximately what time(s)? no  Did you have any difficulty with the equipment?  No  Did you us any type of treatment with this study?  None  Was the head of the bed elevated? No  Did you sleep in a recliner?  No  Did you stop using CPAP at least 3 days before this test?  NA  Any other information you'd like us to know?

## 2024-10-22 NOTE — PROCEDURES
Home Sleep Study Interpretation    Patient: Korin Islas  MRN: [id number]  YOB: 1960  Study Date: 10/15/2024  PCP/Referring Provider: Christine Turner;  Ordering Provider: KAHLIL Edward CNP    Indications for Home Study: Korin is a 64 Female with a history of insomnia, rheumatoid arthritis, HLD, depression with anxiety  who presents with symptoms suggestive of obstructive sleep apnea      Weight: 170.0 lbs  BMI: 29.6   [place]oon: 10/24  STOP BAN/8      Data: A full night home sleep study was performed recording the standard physiologic parameters including body position, movement, sound, nasal pressure, thermal oral airflow, chest and abdominal movements with respiratory inductance plethysmography, and oxygen saturation by pulse oximetry. Pulse rate was estimated by oximetry recording. This study was considered adequate based on > 4 hours of quality oximetry and respiratory recording. As specified by the AASM Manual for the Scoring of Sleep and Associated events, version 2.3, Rule VIII.D 1B, 4% oxygen desaturation scoring for hypopneas is used as a standard of care on all home sleep apnea testing.    Analysis Time: 472.4 minutes    Respiration:  Sleep Associated Hypoxemia: Sustained hypoxemia was present. Baseline oxygen saturation was 93%. Time with saturation less than 89% was 9 minutes. Time with saturation less than 90% was 22.1 minutes. The lowest oxygen saturation was 85.0%.  Snoring: Snoring was present 52% of the time with an average level of 74 dB. Duration of time snoring above 70 dB was 246.4 minutes.    Respiratory events: The home study revealed a presence of 23 obstructive apneas and 12 mixed and central apneas. There were 72 hypopneas resulting in a combined apnea/hypopnea index [AHI] of 14 events per hour with  21 per hour supine, NA  per hour prone, NA per hour upright, 12  per hour left side, and 13 per hour right side.    Position: Percent of time spent: Supine 11%,  prone 0%, upright 0%, on left 44%, on right 45%.    Heart Rate: By Pulse Oximetry normal rate was noted.    Assessment:  Mild obstructive sleep apnea.  Sleep associated hypoxemia was present.    Recommendations:  Consider auto-CPAP at 5-15 cmH2O or oral appliance therapy  Suggest optimizing sleep hygiene and avoiding sleep deprivation  Weight management    Diagnosis Code(s): Obstructive Sleep Apnea G47.33, Hypoxemia G47.36, Snoring R06.83    Electronically signed by: Celeste Abdi MD October 22, 2024  Diplomate, American Board of Internal Medicine, Sleep Medicine

## 2024-10-22 NOTE — PROGRESS NOTES
This HSAT was performed using a Noxturnal T3 device which recorded snore, sound, movement activity, body position, nasal pressure, oronasal thermal airflow, pulse, oximetry and both chest and abdominal respiratory effort. HSAT data was restricted to the time patient states they were in bed.     HSAT was scored using 1B 4% hypopnea rule.     AHI: 13.6.  Snoring was reported as loud.  Time with SpO2 below 89% was 9 minutes.   Overall signal quality was good     Pt will follow up with sleep provider to determine appropriate therapy.     Ordering Provider, Jean Marie Caicedo APRN CNP C. Oyugi, BA, RPSGT, RST System Clinical Specialist/ 10/22/2024

## 2024-11-12 ENCOUNTER — MYC MEDICAL ADVICE (OUTPATIENT)
Dept: SLEEP MEDICINE | Facility: CLINIC | Age: 64
End: 2024-11-12
Payer: COMMERCIAL

## 2024-11-12 DIAGNOSIS — G47.33 OSA (OBSTRUCTIVE SLEEP APNEA): Primary | ICD-10-CM

## 2024-11-12 DIAGNOSIS — G47.00 INSOMNIA, UNSPECIFIED TYPE: ICD-10-CM

## 2024-12-04 ENCOUNTER — DOCUMENTATION ONLY (OUTPATIENT)
Dept: SLEEP MEDICINE | Facility: CLINIC | Age: 64
End: 2024-12-04
Payer: COMMERCIAL

## 2024-12-04 DIAGNOSIS — G47.33 OBSTRUCTIVE SLEEP APNEA: Primary | ICD-10-CM

## 2024-12-04 NOTE — PROGRESS NOTES
Patient was offered choice of vendor and chose [facility].  Patient Korin Islas was set up at [place] on December 4, 2024. Patient received a Resmed Airsense 11 Pressures were set at  5-15 cm H2O.   Patient s ramp is 5 cm H2O for Auto and FLEX/EPR is 2.  Patient received a Resmed Mask name: AIRFIT P30I  Pillow mask size STANDARD, heated tubing and heated humidifier.  Patient has the following compliance requirements: using and visit requirements  Patient has a follow up on 2/27 with SEVERINO Rodriguez.    Ashley Guerra

## 2024-12-09 ENCOUNTER — DOCUMENTATION ONLY (OUTPATIENT)
Dept: SLEEP MEDICINE | Facility: CLINIC | Age: 64
End: 2024-12-09
Payer: COMMERCIAL

## 2024-12-09 NOTE — PROGRESS NOTES
3 day Sleep therapy management telephone visit    Diagnostic AHI:    HST: 13.6        SUBJECTIVE: Patient reports doing poor with CPAP. She tried for a few minutes but wasnT able to exhale against the  high pressure and wanted it lowered. Let her know that there will need to be adjustment to breathing against the pressure, that I  could only lower her ramp to 4. She will practice using CPAP while watching tv to better tolerate. Will consider mask exchange if unable to tolerate.  Patient was given my contact information and instructed to reach out with any questions or concerns.         Device settings:  CPAP MIN CPAP MAX EPR RESMED SOFT RESPONSE SETTING   5.0 cm  H20 15.0 cm  H20 TWO OFF         Patient has the following upcoming sleep appts:  Future Sleep Appointments         Provider Department    2/27/2025 10:00 AM (Arrive by 9:45 AM) Simran Burton, APRN CNP [facility] [place]            Replacement device: No  STM ordered by provider: Yes     Total time spent on accessing and  interpreting remote patient PAP therapy data  10 minutes    Total time spent counseling, coaching  and reviewing PAP therapy data with patient  5 minutes

## 2024-12-11 PROBLEM — M25.512 CHRONIC PAIN OF BOTH SHOULDERS: Status: RESOLVED | Noted: 2018-09-23 | Resolved: 2024-12-11

## 2024-12-11 PROBLEM — G89.29 CHRONIC PAIN OF BOTH SHOULDERS: Status: RESOLVED | Noted: 2018-09-23 | Resolved: 2024-12-11

## 2024-12-11 PROBLEM — M25.511 CHRONIC PAIN OF BOTH SHOULDERS: Status: RESOLVED | Noted: 2018-09-23 | Resolved: 2024-12-11

## 2024-12-19 ENCOUNTER — DOCUMENTATION ONLY (OUTPATIENT)
Dept: SLEEP MEDICINE | Facility: CLINIC | Age: 64
End: 2024-12-19
Payer: COMMERCIAL

## 2024-12-19 NOTE — PROGRESS NOTES
14 day Sleep therapy management telephone visit    Diagnostic AHI:    HST: 13.6        SUBJECTIVE: Patient reports doing better with CPAP. She has gotten more used to breathing with the CPAP. She is using it 4-6 hours nightly. She feels she has to be sitting up to tolerate CPAP.  She feels claustrophobic / suffocating when flat. Discussed possibility of increasing ramp (currently 4) when she gets more used to it as more air will help.  Patient was given my contact information and instructed to reach out with any questions or concerns.       Objective measures: 14 day rolling measures   COMPLIANCE LEAK AHI AVERAGE USE IN MINUTES   50 % 12.44 1.14 190   GOAL >70% GOAL < 24 LPM GOAL <5 GOAL >240          Device settings:  CPAP MIN CPAP MAX EPR RESMED SOFT RESPONSE SETTING   5.0 cm  H20 15.0 cm  H20 TWO OFF         Patient has the following upcoming sleep appts:  Future Sleep Appointments         Provider Department    2/27/2025 10:00 AM (Arrive by 9:45 AM) Simran Burton, KAHLIL [facility] [place]            Replacement device: No  STM ordered by provider: Yes     Total time spent on accessing and  interpreting remote patient PAP therapy data  10 minutes    Total time spent counseling, coaching  and reviewing PAP therapy data with patient  3 minutes

## 2025-02-28 PROBLEM — M05.79 RHEUMATOID ARTHRITIS INVOLVING MULTIPLE SITES WITH POSITIVE RHEUMATOID FACTOR (H): Status: ACTIVE | Noted: 2025-02-28

## 2025-03-17 ENCOUNTER — ANCILLARY PROCEDURE (OUTPATIENT)
Dept: MAMMOGRAPHY | Facility: CLINIC | Age: 65
End: 2025-03-17
Attending: PHYSICIAN ASSISTANT
Payer: COMMERCIAL

## 2025-03-17 DIAGNOSIS — Z80.3 FAMILY HISTORY OF MALIGNANT NEOPLASM OF BREAST: ICD-10-CM

## 2025-03-17 DIAGNOSIS — Z12.31 ENCOUNTER FOR SCREENING MAMMOGRAM FOR BREAST CANCER: ICD-10-CM

## 2025-03-17 PROCEDURE — 77063 BREAST TOMOSYNTHESIS BI: CPT | Mod: TC | Performed by: RADIOLOGY

## 2025-03-17 PROCEDURE — 77067 SCR MAMMO BI INCL CAD: CPT | Mod: TC | Performed by: RADIOLOGY

## 2025-05-03 ENCOUNTER — HEALTH MAINTENANCE LETTER (OUTPATIENT)
Age: 65
End: 2025-05-03

## 2025-05-03 ENCOUNTER — MYC REFILL (OUTPATIENT)
Dept: PEDIATRICS | Facility: CLINIC | Age: 65
End: 2025-05-03
Payer: COMMERCIAL

## 2025-05-03 DIAGNOSIS — F41.1 GAD (GENERALIZED ANXIETY DISORDER): ICD-10-CM

## 2025-05-05 RX ORDER — CITALOPRAM HYDROBROMIDE 10 MG/1
10 TABLET ORAL DAILY
Qty: 90 TABLET | Refills: 3 | OUTPATIENT
Start: 2025-05-05

## 2025-08-05 ENCOUNTER — OFFICE VISIT (OUTPATIENT)
Dept: URGENT CARE | Facility: URGENT CARE | Age: 65
End: 2025-08-05
Payer: COMMERCIAL

## 2025-08-05 VITALS
SYSTOLIC BLOOD PRESSURE: 117 MMHG | DIASTOLIC BLOOD PRESSURE: 71 MMHG | WEIGHT: 175.4 LBS | BODY MASS INDEX: 29.94 KG/M2 | HEART RATE: 62 BPM | TEMPERATURE: 98 F | RESPIRATION RATE: 21 BRPM | HEIGHT: 64 IN | OXYGEN SATURATION: 95 %

## 2025-08-05 DIAGNOSIS — R07.0 THROAT PAIN: Primary | ICD-10-CM

## 2025-08-05 LAB
DEPRECATED S PYO AG THROAT QL EIA: NEGATIVE
S PYO DNA THROAT QL NAA+PROBE: NOT DETECTED

## 2025-08-05 PROCEDURE — 87651 STREP A DNA AMP PROBE: CPT | Performed by: PHYSICIAN ASSISTANT
